# Patient Record
Sex: MALE | Race: OTHER | Employment: UNEMPLOYED | URBAN - METROPOLITAN AREA
[De-identification: names, ages, dates, MRNs, and addresses within clinical notes are randomized per-mention and may not be internally consistent; named-entity substitution may affect disease eponyms.]

---

## 2024-09-25 ENCOUNTER — APPOINTMENT (OUTPATIENT)
Dept: GENERAL RADIOLOGY | Age: 49
DRG: 208 | End: 2024-09-25

## 2024-09-25 ENCOUNTER — HOSPITAL ENCOUNTER (INPATIENT)
Age: 49
LOS: 7 days | Discharge: NURSING, REHAB OR LONG-TERM CARE | DRG: 208 | End: 2024-10-02
Attending: EMERGENCY MEDICINE | Admitting: INTERNAL MEDICINE

## 2024-09-25 DIAGNOSIS — I50.9 CONGESTIVE HEART FAILURE, UNSPECIFIED HF CHRONICITY, UNSPECIFIED HEART FAILURE TYPE (HCC): ICD-10-CM

## 2024-09-25 DIAGNOSIS — J96.00 ACUTE RESPIRATORY FAILURE, UNSPECIFIED WHETHER WITH HYPOXIA OR HYPERCAPNIA: ICD-10-CM

## 2024-09-25 DIAGNOSIS — J45.901 ASTHMA WITH ACUTE EXACERBATION, UNSPECIFIED ASTHMA SEVERITY, UNSPECIFIED WHETHER PERSISTENT: Primary | ICD-10-CM

## 2024-09-25 LAB
AADO2: 116.2 MMHG
AADO2: 303.1 MMHG
ALBUMIN SERPL-MCNC: 4.7 G/DL (ref 3.5–5.2)
ALP SERPL-CCNC: 96 U/L (ref 40–129)
ALT SERPL-CCNC: 54 U/L (ref 0–40)
ANION GAP SERPL CALCULATED.3IONS-SCNC: 13 MMOL/L (ref 7–16)
AST SERPL-CCNC: 36 U/L (ref 0–39)
B.E.: -11.5 MMOL/L (ref -3–3)
B.E.: -6.9 MMOL/L (ref -3–3)
BASOPHILS # BLD: 0.14 K/UL (ref 0–0.2)
BASOPHILS NFR BLD: 1 % (ref 0–2)
BILIRUB SERPL-MCNC: 0.3 MG/DL (ref 0–1.2)
BUN SERPL-MCNC: 20 MG/DL (ref 6–20)
CALCIUM SERPL-MCNC: 9.9 MG/DL (ref 8.6–10.2)
CHLORIDE SERPL-SCNC: 102 MMOL/L (ref 98–107)
CO2 SERPL-SCNC: 23 MMOL/L (ref 22–29)
COHB: 0.2 % (ref 0–1.5)
COHB: 0.5 % (ref 0–1.5)
COMMENT: ABNORMAL
CREAT SERPL-MCNC: 1.1 MG/DL (ref 0.7–1.2)
CRITICAL: ABNORMAL
CRITICAL: ABNORMAL
DATE ANALYZED: ABNORMAL
DATE ANALYZED: ABNORMAL
DATE OF COLLECTION: ABNORMAL
DATE OF COLLECTION: ABNORMAL
EOSINOPHIL # BLD: 1.63 K/UL (ref 0.05–0.5)
EOSINOPHILS RELATIVE PERCENT: 10 % (ref 0–6)
ERYTHROCYTE [DISTWIDTH] IN BLOOD BY AUTOMATED COUNT: 12.8 % (ref 11.5–15)
FIO2: 100 %
FIO2: 60 %
FLUAV RNA RESP QL NAA+PROBE: NOT DETECTED
FLUBV RNA RESP QL NAA+PROBE: NOT DETECTED
GFR, ESTIMATED: 87 ML/MIN/1.73M2
GLUCOSE BLD-MCNC: 329 MG/DL (ref 74–99)
GLUCOSE SERPL-MCNC: 180 MG/DL (ref 74–99)
HCO3: 21.2 MMOL/L (ref 22–26)
HCO3: 21.5 MMOL/L (ref 22–26)
HCT VFR BLD AUTO: 47.6 % (ref 37–54)
HGB BLD-MCNC: 16 G/DL (ref 12.5–16.5)
HHB: 0.5 % (ref 0–5)
HHB: 18.3 % (ref 0–5)
IMM GRANULOCYTES # BLD AUTO: 0.11 K/UL (ref 0–0.58)
IMM GRANULOCYTES NFR BLD: 1 % (ref 0–5)
LAB: ABNORMAL
LAB: ABNORMAL
LYMPHOCYTES NFR BLD: 3.83 K/UL (ref 1.5–4)
LYMPHOCYTES RELATIVE PERCENT: 23 % (ref 20–42)
Lab: 1618
Lab: 2054
MAGNESIUM SERPL-MCNC: 2.1 MG/DL (ref 1.6–2.6)
MCH RBC QN AUTO: 29.1 PG (ref 26–35)
MCHC RBC AUTO-ENTMCNC: 33.6 G/DL (ref 32–34.5)
MCV RBC AUTO: 86.7 FL (ref 80–99.9)
METHB: 0.4 % (ref 0–1.5)
METHB: 0.6 % (ref 0–1.5)
MODE: ABNORMAL
MODE: AC
MONOCYTES NFR BLD: 1.04 K/UL (ref 0.1–0.95)
MONOCYTES NFR BLD: 6 % (ref 2–12)
NEUTROPHILS NFR BLD: 60 % (ref 43–80)
NEUTS SEG NFR BLD: 10.3 K/UL (ref 1.8–7.3)
O2 SATURATION: 81.5 % (ref 92–98.5)
O2 SATURATION: 99.5 % (ref 92–98.5)
O2HB: 80.8 % (ref 94–97)
O2HB: 98.7 % (ref 94–97)
OPERATOR ID: 8217
OPERATOR ID: 8219
PATIENT TEMP: 37 C
PATIENT TEMP: 37 C
PCO2: 52.4 MMHG (ref 35–45)
PCO2: 85.3 MMHG (ref 35–45)
PEEP/CPAP: 6 CMH2O
PEEP/CPAP: 8 CMH2O
PFO2: 0.87 MMHG/%
PFO2: 4.86 MMHG/%
PH BLOOD GAS: 7.02 (ref 7.35–7.45)
PH BLOOD GAS: 7.22 (ref 7.35–7.45)
PIP: 12 CMH2O
PLATELET # BLD AUTO: 362 K/UL (ref 130–450)
PMV BLD AUTO: 9.2 FL (ref 7–12)
PO2: 486.5 MMHG (ref 75–100)
PO2: 52.1 MMHG (ref 75–100)
POTASSIUM SERPL-SCNC: 5.1 MMOL/L (ref 3.5–5)
POTASSIUM SERPL-SCNC: 6 MMOL/L (ref 3.5–5)
PROCALCITONIN SERPL-MCNC: 0.13 NG/ML (ref 0–0.08)
PROT SERPL-MCNC: 8.5 G/DL (ref 6.4–8.3)
RBC # BLD AUTO: 5.49 M/UL (ref 3.8–5.8)
RI(T): 0.24
RI(T): 5.82
RR MECHANICAL: 16 B/MIN
SARS-COV-2 RNA RESP QL NAA+PROBE: NOT DETECTED
SODIUM SERPL-SCNC: 138 MMOL/L (ref 132–146)
SOURCE, BLOOD GAS: ABNORMAL
SOURCE, BLOOD GAS: ABNORMAL
SOURCE: NORMAL
SPECIMEN DESCRIPTION: NORMAL
THB: 15.1 G/DL (ref 11.5–16.5)
THB: 15.4 G/DL (ref 11.5–16.5)
TIME ANALYZED: 1620
TIME ANALYZED: 2059
VT MECHANICAL: 500 ML
WBC OTHER # BLD: 17.1 K/UL (ref 4.5–11.5)

## 2024-09-25 PROCEDURE — 6370000000 HC RX 637 (ALT 250 FOR IP): Performed by: INTERNAL MEDICINE

## 2024-09-25 PROCEDURE — 6370000000 HC RX 637 (ALT 250 FOR IP): Performed by: EMERGENCY MEDICINE

## 2024-09-25 PROCEDURE — 74018 RADEX ABDOMEN 1 VIEW: CPT

## 2024-09-25 PROCEDURE — 2580000003 HC RX 258: Performed by: EMERGENCY MEDICINE

## 2024-09-25 PROCEDURE — 85025 COMPLETE CBC W/AUTO DIFF WBC: CPT

## 2024-09-25 PROCEDURE — 31500 INSERT EMERGENCY AIRWAY: CPT

## 2024-09-25 PROCEDURE — 82962 GLUCOSE BLOOD TEST: CPT

## 2024-09-25 PROCEDURE — 80053 COMPREHEN METABOLIC PANEL: CPT

## 2024-09-25 PROCEDURE — 96372 THER/PROPH/DIAG INJ SC/IM: CPT

## 2024-09-25 PROCEDURE — 6360000002 HC RX W HCPCS: Performed by: EMERGENCY MEDICINE

## 2024-09-25 PROCEDURE — 94002 VENT MGMT INPAT INIT DAY: CPT

## 2024-09-25 PROCEDURE — 96367 TX/PROPH/DG ADDL SEQ IV INF: CPT

## 2024-09-25 PROCEDURE — 94640 AIRWAY INHALATION TREATMENT: CPT

## 2024-09-25 PROCEDURE — 99291 CRITICAL CARE FIRST HOUR: CPT

## 2024-09-25 PROCEDURE — 6360000002 HC RX W HCPCS: Performed by: INTERNAL MEDICINE

## 2024-09-25 PROCEDURE — 0BH17EZ INSERTION OF ENDOTRACHEAL AIRWAY INTO TRACHEA, VIA NATURAL OR ARTIFICIAL OPENING: ICD-10-PCS | Performed by: INTERNAL MEDICINE

## 2024-09-25 PROCEDURE — 87636 SARSCOV2 & INF A&B AMP PRB: CPT

## 2024-09-25 PROCEDURE — 96366 THER/PROPH/DIAG IV INF ADDON: CPT

## 2024-09-25 PROCEDURE — 2000000000 HC ICU R&B

## 2024-09-25 PROCEDURE — 71045 X-RAY EXAM CHEST 1 VIEW: CPT

## 2024-09-25 PROCEDURE — 99223 1ST HOSP IP/OBS HIGH 75: CPT | Performed by: INTERNAL MEDICINE

## 2024-09-25 PROCEDURE — 84132 ASSAY OF SERUM POTASSIUM: CPT

## 2024-09-25 PROCEDURE — 84145 PROCALCITONIN (PCT): CPT

## 2024-09-25 PROCEDURE — 5A1945Z RESPIRATORY VENTILATION, 24-96 CONSECUTIVE HOURS: ICD-10-PCS | Performed by: INTERNAL MEDICINE

## 2024-09-25 PROCEDURE — 82805 BLOOD GASES W/O2 SATURATION: CPT

## 2024-09-25 PROCEDURE — 96365 THER/PROPH/DIAG IV INF INIT: CPT

## 2024-09-25 PROCEDURE — 96375 TX/PRO/DX INJ NEW DRUG ADDON: CPT

## 2024-09-25 PROCEDURE — 2580000003 HC RX 258: Performed by: INTERNAL MEDICINE

## 2024-09-25 PROCEDURE — 96361 HYDRATE IV INFUSION ADD-ON: CPT

## 2024-09-25 PROCEDURE — 5A09457 ASSISTANCE WITH RESPIRATORY VENTILATION, 24-96 CONSECUTIVE HOURS, CONTINUOUS POSITIVE AIRWAY PRESSURE: ICD-10-PCS | Performed by: INTERNAL MEDICINE

## 2024-09-25 PROCEDURE — 2500000003 HC RX 250 WO HCPCS

## 2024-09-25 PROCEDURE — 83735 ASSAY OF MAGNESIUM: CPT

## 2024-09-25 PROCEDURE — 2500000003 HC RX 250 WO HCPCS: Performed by: EMERGENCY MEDICINE

## 2024-09-25 RX ORDER — ROCURONIUM BROMIDE 10 MG/ML
100 INJECTION, SOLUTION INTRAVENOUS ONCE
Status: DISCONTINUED | OUTPATIENT
Start: 2024-09-25 | End: 2024-09-26

## 2024-09-25 RX ORDER — IPRATROPIUM BROMIDE AND ALBUTEROL SULFATE 2.5; .5 MG/3ML; MG/3ML
1 SOLUTION RESPIRATORY (INHALATION)
Status: DISCONTINUED | OUTPATIENT
Start: 2024-09-25 | End: 2024-10-02 | Stop reason: HOSPADM

## 2024-09-25 RX ORDER — TERBUTALINE SULFATE 1 MG/ML
0.25 INJECTION, SOLUTION SUBCUTANEOUS ONCE
Status: COMPLETED | OUTPATIENT
Start: 2024-09-25 | End: 2024-09-25

## 2024-09-25 RX ORDER — CALCIUM CARBONATE 500 MG/1
500 TABLET, CHEWABLE ORAL 3 TIMES DAILY PRN
Status: DISCONTINUED | OUTPATIENT
Start: 2024-09-25 | End: 2024-10-02 | Stop reason: HOSPADM

## 2024-09-25 RX ORDER — BUDESONIDE 0.5 MG/2ML
0.5 INHALANT ORAL
Status: DISCONTINUED | OUTPATIENT
Start: 2024-09-25 | End: 2024-10-02 | Stop reason: HOSPADM

## 2024-09-25 RX ORDER — MAGNESIUM SULFATE IN WATER 40 MG/ML
2000 INJECTION, SOLUTION INTRAVENOUS ONCE
Status: COMPLETED | OUTPATIENT
Start: 2024-09-25 | End: 2024-09-25

## 2024-09-25 RX ORDER — ONDANSETRON 4 MG/1
4 TABLET, ORALLY DISINTEGRATING ORAL EVERY 8 HOURS PRN
Status: DISCONTINUED | OUTPATIENT
Start: 2024-09-25 | End: 2024-10-02 | Stop reason: HOSPADM

## 2024-09-25 RX ORDER — IPRATROPIUM BROMIDE AND ALBUTEROL SULFATE 2.5; .5 MG/3ML; MG/3ML
1 SOLUTION RESPIRATORY (INHALATION)
Status: CANCELLED | OUTPATIENT
Start: 2024-09-25

## 2024-09-25 RX ORDER — PROPOFOL 10 MG/ML
5-50 INJECTION, EMULSION INTRAVENOUS CONTINUOUS
Status: DISCONTINUED | OUTPATIENT
Start: 2024-09-25 | End: 2024-09-27

## 2024-09-25 RX ORDER — IPRATROPIUM BROMIDE AND ALBUTEROL SULFATE 2.5; .5 MG/3ML; MG/3ML
3 SOLUTION RESPIRATORY (INHALATION) ONCE
Status: COMPLETED | OUTPATIENT
Start: 2024-09-25 | End: 2024-09-25

## 2024-09-25 RX ORDER — MONTELUKAST SODIUM 10 MG/1
10 TABLET ORAL NIGHTLY
Status: DISCONTINUED | OUTPATIENT
Start: 2024-09-26 | End: 2024-09-30

## 2024-09-25 RX ORDER — HYDRALAZINE HYDROCHLORIDE 20 MG/ML
10 INJECTION INTRAMUSCULAR; INTRAVENOUS EVERY 6 HOURS PRN
Status: DISCONTINUED | OUTPATIENT
Start: 2024-09-25 | End: 2024-10-02 | Stop reason: HOSPADM

## 2024-09-25 RX ORDER — ACETAMINOPHEN 325 MG/1
650 TABLET ORAL EVERY 6 HOURS PRN
Status: DISCONTINUED | OUTPATIENT
Start: 2024-09-25 | End: 2024-09-28

## 2024-09-25 RX ORDER — ACETAMINOPHEN 650 MG/1
650 SUPPOSITORY RECTAL EVERY 6 HOURS PRN
Status: DISCONTINUED | OUTPATIENT
Start: 2024-09-25 | End: 2024-09-28

## 2024-09-25 RX ORDER — BENZONATATE 100 MG/1
100 CAPSULE ORAL 3 TIMES DAILY PRN
Status: DISCONTINUED | OUTPATIENT
Start: 2024-09-25 | End: 2024-10-02 | Stop reason: HOSPADM

## 2024-09-25 RX ORDER — ONDANSETRON 2 MG/ML
4 INJECTION INTRAMUSCULAR; INTRAVENOUS EVERY 6 HOURS PRN
Status: DISCONTINUED | OUTPATIENT
Start: 2024-09-25 | End: 2024-10-02 | Stop reason: HOSPADM

## 2024-09-25 RX ORDER — ALBUTEROL SULFATE 0.83 MG/ML
7.5 SOLUTION RESPIRATORY (INHALATION) ONCE
Status: COMPLETED | OUTPATIENT
Start: 2024-09-25 | End: 2024-09-25

## 2024-09-25 RX ORDER — GLUCAGON 1 MG/ML
1 KIT INJECTION PRN
Status: DISCONTINUED | OUTPATIENT
Start: 2024-09-25 | End: 2024-09-26 | Stop reason: SDUPTHER

## 2024-09-25 RX ORDER — 0.9 % SODIUM CHLORIDE 0.9 %
1000 INTRAVENOUS SOLUTION INTRAVENOUS ONCE
Status: COMPLETED | OUTPATIENT
Start: 2024-09-25 | End: 2024-09-25

## 2024-09-25 RX ORDER — ROSUVASTATIN CALCIUM 10 MG/1
5 TABLET, COATED ORAL NIGHTLY
Status: DISCONTINUED | OUTPATIENT
Start: 2024-09-26 | End: 2024-10-01

## 2024-09-25 RX ORDER — MIDAZOLAM HYDROCHLORIDE 2 MG/2ML
5 INJECTION, SOLUTION INTRAMUSCULAR; INTRAVENOUS ONCE
Status: COMPLETED | OUTPATIENT
Start: 2024-09-25 | End: 2024-09-25

## 2024-09-25 RX ORDER — SODIUM CHLORIDE 9 MG/ML
INJECTION, SOLUTION INTRAVENOUS PRN
Status: DISCONTINUED | OUTPATIENT
Start: 2024-09-25 | End: 2024-10-02 | Stop reason: HOSPADM

## 2024-09-25 RX ORDER — DEXTROSE MONOHYDRATE 100 MG/ML
INJECTION, SOLUTION INTRAVENOUS CONTINUOUS PRN
Status: DISCONTINUED | OUTPATIENT
Start: 2024-09-25 | End: 2024-10-02 | Stop reason: HOSPADM

## 2024-09-25 RX ORDER — ARFORMOTEROL TARTRATE 15 UG/2ML
15 SOLUTION RESPIRATORY (INHALATION)
Status: DISCONTINUED | OUTPATIENT
Start: 2024-09-25 | End: 2024-09-26

## 2024-09-25 RX ORDER — POTASSIUM CHLORIDE 7.45 MG/ML
10 INJECTION INTRAVENOUS PRN
Status: DISCONTINUED | OUTPATIENT
Start: 2024-09-25 | End: 2024-09-25

## 2024-09-25 RX ORDER — SODIUM CHLORIDE 0.9 % (FLUSH) 0.9 %
5-40 SYRINGE (ML) INJECTION PRN
Status: DISCONTINUED | OUTPATIENT
Start: 2024-09-25 | End: 2024-10-02 | Stop reason: HOSPADM

## 2024-09-25 RX ORDER — ENOXAPARIN SODIUM 100 MG/ML
40 INJECTION SUBCUTANEOUS DAILY
Status: DISCONTINUED | OUTPATIENT
Start: 2024-09-26 | End: 2024-10-02 | Stop reason: HOSPADM

## 2024-09-25 RX ORDER — SODIUM CHLORIDE 0.9 % (FLUSH) 0.9 %
5-40 SYRINGE (ML) INJECTION EVERY 12 HOURS SCHEDULED
Status: DISCONTINUED | OUTPATIENT
Start: 2024-09-25 | End: 2024-10-02 | Stop reason: HOSPADM

## 2024-09-25 RX ORDER — INSULIN LISPRO 100 [IU]/ML
0-4 INJECTION, SOLUTION INTRAVENOUS; SUBCUTANEOUS NIGHTLY
Status: DISCONTINUED | OUTPATIENT
Start: 2024-09-25 | End: 2024-09-26

## 2024-09-25 RX ORDER — LOSARTAN POTASSIUM 25 MG/1
25 TABLET ORAL DAILY
Status: DISCONTINUED | OUTPATIENT
Start: 2024-09-26 | End: 2024-10-02 | Stop reason: HOSPADM

## 2024-09-25 RX ORDER — INSULIN GLARGINE 100 [IU]/ML
12 INJECTION, SOLUTION SUBCUTANEOUS NIGHTLY
Status: DISCONTINUED | OUTPATIENT
Start: 2024-09-25 | End: 2024-09-27

## 2024-09-25 RX ORDER — MAGNESIUM SULFATE IN WATER 40 MG/ML
2000 INJECTION, SOLUTION INTRAVENOUS PRN
Status: DISCONTINUED | OUTPATIENT
Start: 2024-09-25 | End: 2024-09-25

## 2024-09-25 RX ORDER — POLYETHYLENE GLYCOL 3350 17 G/17G
17 POWDER, FOR SOLUTION ORAL DAILY PRN
Status: DISCONTINUED | OUTPATIENT
Start: 2024-09-25 | End: 2024-10-02 | Stop reason: HOSPADM

## 2024-09-25 RX ORDER — MIDAZOLAM HYDROCHLORIDE 1 MG/ML
1-10 INJECTION, SOLUTION INTRAVENOUS CONTINUOUS
Status: DISCONTINUED | OUTPATIENT
Start: 2024-09-25 | End: 2024-09-27

## 2024-09-25 RX ORDER — LANOLIN ALCOHOL/MO/W.PET/CERES
3 CREAM (GRAM) TOPICAL NIGHTLY PRN
Status: CANCELLED | OUTPATIENT
Start: 2024-09-25

## 2024-09-25 RX ORDER — TERBUTALINE SULFATE 1 MG/ML
0.25 INJECTION, SOLUTION SUBCUTANEOUS ONCE
Status: DISCONTINUED | OUTPATIENT
Start: 2024-09-25 | End: 2024-09-25

## 2024-09-25 RX ORDER — POTASSIUM CHLORIDE 1500 MG/1
40 TABLET, EXTENDED RELEASE ORAL PRN
Status: DISCONTINUED | OUTPATIENT
Start: 2024-09-25 | End: 2024-09-25

## 2024-09-25 RX ORDER — INSULIN LISPRO 100 [IU]/ML
0-4 INJECTION, SOLUTION INTRAVENOUS; SUBCUTANEOUS
Status: DISCONTINUED | OUTPATIENT
Start: 2024-09-25 | End: 2024-09-26

## 2024-09-25 RX ORDER — SODIUM CHLORIDE 9 MG/ML
INJECTION, SOLUTION INTRAVENOUS CONTINUOUS
Status: DISCONTINUED | OUTPATIENT
Start: 2024-09-25 | End: 2024-09-26

## 2024-09-25 RX ADMIN — INSULIN GLARGINE 12 UNITS: 100 INJECTION, SOLUTION SUBCUTANEOUS at 21:16

## 2024-09-25 RX ADMIN — CEFEPIME 2000 MG: 2 INJECTION, POWDER, FOR SOLUTION INTRAVENOUS at 20:09

## 2024-09-25 RX ADMIN — MIDAZOLAM 2 MG: 1 INJECTION INTRAMUSCULAR; INTRAVENOUS at 23:25

## 2024-09-25 RX ADMIN — SODIUM CHLORIDE 1000 ML: 9 INJECTION, SOLUTION INTRAVENOUS at 13:40

## 2024-09-25 RX ADMIN — SODIUM CHLORIDE, PRESERVATIVE FREE 5 ML: 5 INJECTION INTRAVENOUS at 21:25

## 2024-09-25 RX ADMIN — PROPOFOL 25 MG: 10 INJECTION, EMULSION INTRAVENOUS at 21:02

## 2024-09-25 RX ADMIN — Medication 180 MG: at 20:19

## 2024-09-25 RX ADMIN — TERBUTALINE SULFATE 0.25 MG: 1 INJECTION, SOLUTION SUBCUTANEOUS at 18:40

## 2024-09-25 RX ADMIN — SODIUM CHLORIDE: 9 INJECTION, SOLUTION INTRAVENOUS at 18:08

## 2024-09-25 RX ADMIN — ALBUTEROL SULFATE 7.5 MG: 2.5 SOLUTION RESPIRATORY (INHALATION) at 18:15

## 2024-09-25 RX ADMIN — PROPOFOL 25 MCG/KG/MIN: 10 INJECTION, EMULSION INTRAVENOUS at 21:00

## 2024-09-25 RX ADMIN — ALBUTEROL SULFATE 7.5 MG: 2.5 SOLUTION RESPIRATORY (INHALATION) at 14:40

## 2024-09-25 RX ADMIN — PROPOFOL 50 MCG/KG/MIN: 10 INJECTION, EMULSION INTRAVENOUS at 23:51

## 2024-09-25 RX ADMIN — HYDRALAZINE HYDROCHLORIDE 10 MG: 20 INJECTION INTRAMUSCULAR; INTRAVENOUS at 23:28

## 2024-09-25 RX ADMIN — IPRATROPIUM BROMIDE AND ALBUTEROL SULFATE 1 DOSE: 2.5; .5 SOLUTION RESPIRATORY (INHALATION) at 21:00

## 2024-09-25 RX ADMIN — METHYLPREDNISOLONE SODIUM SUCCINATE 125 MG: 125 INJECTION, POWDER, LYOPHILIZED, FOR SOLUTION INTRAMUSCULAR; INTRAVENOUS at 13:49

## 2024-09-25 RX ADMIN — IPRATROPIUM BROMIDE AND ALBUTEROL SULFATE 3 DOSE: 2.5; .5 SOLUTION RESPIRATORY (INHALATION) at 17:34

## 2024-09-25 RX ADMIN — MAGNESIUM SULFATE HEPTAHYDRATE 2000 MG: 40 INJECTION, SOLUTION INTRAVENOUS at 15:29

## 2024-09-25 RX ADMIN — IPRATROPIUM BROMIDE AND ALBUTEROL SULFATE 3 DOSE: 2.5; .5 SOLUTION RESPIRATORY (INHALATION) at 15:56

## 2024-09-25 RX ADMIN — IPRATROPIUM BROMIDE AND ALBUTEROL SULFATE 3 DOSE: 2.5; .5 SOLUTION RESPIRATORY (INHALATION) at 13:46

## 2024-09-25 RX ADMIN — VANCOMYCIN HYDROCHLORIDE 2250 MG: 10 INJECTION, POWDER, LYOPHILIZED, FOR SOLUTION INTRAVENOUS at 21:20

## 2024-09-25 RX ADMIN — TERBUTALINE SULFATE 0.25 MG: 1 INJECTION, SOLUTION SUBCUTANEOUS at 19:05

## 2024-09-25 RX ADMIN — MAGNESIUM SULFATE HEPTAHYDRATE 2000 MG: 40 INJECTION, SOLUTION INTRAVENOUS at 13:56

## 2024-09-25 RX ADMIN — IPRATROPIUM BROMIDE AND ALBUTEROL SULFATE 3 DOSE: 2.5; .5 SOLUTION RESPIRATORY (INHALATION) at 16:55

## 2024-09-25 RX ADMIN — Medication 27.2 MG: at 16:08

## 2024-09-25 RX ADMIN — BUDESONIDE 500 MCG: 0.5 SUSPENSION RESPIRATORY (INHALATION) at 16:57

## 2024-09-25 RX ADMIN — SODIUM CHLORIDE 1000 ML: 9 INJECTION, SOLUTION INTRAVENOUS at 14:40

## 2024-09-25 RX ADMIN — ARFORMOTEROL TARTRATE 15 MCG: 15 SOLUTION RESPIRATORY (INHALATION) at 16:56

## 2024-09-25 ASSESSMENT — LIFESTYLE VARIABLES
HOW MANY STANDARD DRINKS CONTAINING ALCOHOL DO YOU HAVE ON A TYPICAL DAY: PATIENT DOES NOT DRINK
HOW OFTEN DO YOU HAVE A DRINK CONTAINING ALCOHOL: NEVER

## 2024-09-25 ASSESSMENT — PAIN SCALES - GENERAL: PAINLEVEL_OUTOF10: 7

## 2024-09-25 ASSESSMENT — PAIN DESCRIPTION - ORIENTATION: ORIENTATION: RIGHT;LEFT

## 2024-09-25 ASSESSMENT — PULMONARY FUNCTION TESTS: PIF_VALUE: 34

## 2024-09-25 ASSESSMENT — PAIN DESCRIPTION - LOCATION: LOCATION: GENERALIZED;HEAD

## 2024-09-25 ASSESSMENT — PAIN DESCRIPTION - PAIN TYPE: TYPE: ACUTE PAIN

## 2024-09-25 ASSESSMENT — PAIN DESCRIPTION - FREQUENCY: FREQUENCY: CONTINUOUS

## 2024-09-25 ASSESSMENT — PAIN DESCRIPTION - DESCRIPTORS: DESCRIPTORS: ACHING

## 2024-09-25 ASSESSMENT — PAIN - FUNCTIONAL ASSESSMENT: PAIN_FUNCTIONAL_ASSESSMENT: ACTIVITIES ARE NOT PREVENTED

## 2024-09-25 NOTE — ED NOTES
This RN heard pt yelling \"I can't breathe\" from ED supply room. This RN went to bedside and noted primary RN at bedside. Pt noted standing up in tripod position in respiratory distress, gasping for air. HR 150s, SpO2 88%. RT entered room and gave treatments per order, notified DO, placed pt on BiPap. Pt breathing improved w/ bipap, SpO2 100%. PIV placed, meds given. Charge RN notified.

## 2024-09-25 NOTE — ED NOTES
Patient onto bed. Ketamine via IV 20. Ketamine 50 IV. Patient still on CPAP.  100 total of Ketamine given IV. SPO2 100%. 130 total of Ketamine given at this time. Patient off of cpap. 180 of Ketamine given, 100 of Beau total in.   Size 8 tube tried, size 7.5 tried. Patient bagged with SPO2 at 76%. Size 6.5 ET inserted, 25 at the lip. Patient intubated with SPO2 99%

## 2024-09-25 NOTE — H&P
Medications  Prior to Admission medications    Not on File       Allergies  Patient has no known allergies.    Social Hx  Social History     Socioeconomic History    Marital status: Single     Spouse name: Not on file    Number of children: Not on file    Years of education: Not on file    Highest education level: Not on file   Occupational History    Not on file   Tobacco Use    Smoking status: Not on file    Smokeless tobacco: Not on file   Substance and Sexual Activity    Alcohol use: Not on file    Drug use: Not on file    Sexual activity: Not on file   Other Topics Concern    Not on file   Social History Narrative    Not on file     Social Determinants of Health     Financial Resource Strain: High Risk (12/8/2023)    Received from [facility]    Overall Financial Resource Strain (CARDIA)     Difficulty of Paying Living Expenses: Very hard   Food Insecurity: No Food Insecurity (6/27/2024)    Received from [facility]    Hunger Vital Sign     Worried About Running Out of Food in the Last Year: Never true     Ran Out of Food in the Last Year: Never true   Transportation Needs: No Transportation Needs (8/9/2024)    Received from [facility] - Transportation     Lack of Transportation (Medical): No     Lack of Transportation (Non-Medical): No   Recent Concern: Transportation Needs - Unmet Transportation Needs (6/27/2024)    Received from [facility] - Transportation     Lack of Transportation (Medical): Yes     Lack of Transportation (Non-Medical): Yes   Physical Activity: Not on file   Stress: Not on file   Social Connections: Not on file   Intimate Partner Violence: Not on file   Housing Stability: High Risk (8/9/2024)    Received from [facility]    Housing Stability Vital Sign     Unable to Pay for Housing in the Last Year: Not on file     Number of Times Moved in the Last Year: Not on file     Homeless in the Last Year: Yes       Review of Systems  All bolded are positive;

## 2024-09-25 NOTE — ED PROVIDER NOTES
potential alveolar opacities in peripheral right lower chest and given antibiotics by admitting team.  Hemolyzed potassium at 6, no changes in kidney or liver function.  Repeat potassium improved to 5.1.  COVID and flu negative.     Patient was persistently demonstrating evidence of impending respiratory failure, given multiple rounds of DuoNebs, albuterol, terbutaline, second dose of magnesium and given IV ketamine as well.  Demonstrating respiratory fatigue, worsening clinical status, concern for airway compromise and respiratory failure.  Intubated for protection, IV ketamine used, complicated by inability to pass 8, 7.5, 7 ETT and finally 6.5 able to be obtained.  Continuous infusions will be given, on IV propofol for sedation.  Discussed with ICU and they will accept the patient to the unit for critical care management and internal medicine will admit the patient.          Please note that the withdrawal or failure to initiate urgent interventions for this patient would likely result in a life threatening deterioration or permanent disability.      Accordingly this patient received 35 minutes of critical care time, excluding separately billable procedures.    CONSULTS: (Who and What was discussed)  IP CONSULT TO CRITICAL CARE  PHARMACY TO DOSE VANCOMYCIN        Counseling:   The emergency provider has spoken with the patient and discussed today’s results, in addition to providing specific details for the plan of care and counseling regarding the diagnosis and prognosis.  Questions are answered at this time and they are agreeable with the plan.      I am the Primary Clinician of Record.    FINAL IMPRESSION      1. Asthma with acute exacerbation, unspecified asthma severity, unspecified whether persistent    2. Acute respiratory failure, unspecified whether with hypoxia or hypercapnia (HCC)          DISPOSITION/PLAN     DISPOSITION Admitted 09/25/2024 07:58:58 PM    (Please note that portions of this note were

## 2024-09-26 ENCOUNTER — APPOINTMENT (OUTPATIENT)
Age: 49
DRG: 208 | End: 2024-09-26

## 2024-09-26 ENCOUNTER — APPOINTMENT (OUTPATIENT)
Dept: GENERAL RADIOLOGY | Age: 49
DRG: 208 | End: 2024-09-26

## 2024-09-26 ENCOUNTER — APPOINTMENT (OUTPATIENT)
Dept: CT IMAGING | Age: 49
DRG: 208 | End: 2024-09-26

## 2024-09-26 PROBLEM — J45.901 ASTHMA WITH ACUTE EXACERBATION: Status: ACTIVE | Noted: 2024-09-26

## 2024-09-26 LAB
AADO2: 152.4 MMHG
AADO2: 194.5 MMHG
AMPHET UR QL SCN: NEGATIVE
ANION GAP SERPL CALCULATED.3IONS-SCNC: 10 MMOL/L (ref 7–16)
ANION GAP SERPL CALCULATED.3IONS-SCNC: 13 MMOL/L (ref 7–16)
ANION GAP SERPL CALCULATED.3IONS-SCNC: 13 MMOL/L (ref 7–16)
ANION GAP SERPL CALCULATED.3IONS-SCNC: 18 MMOL/L (ref 7–16)
ANION GAP SERPL CALCULATED.3IONS-SCNC: 18 MMOL/L (ref 7–16)
ANION GAP SERPL CALCULATED.3IONS-SCNC: NORMAL MMOL/L (ref 7–16)
APAP SERPL-MCNC: <5 UG/ML (ref 10–30)
B-OH-BUTYR SERPL-MCNC: 0.17 MMOL/L (ref 0.02–0.27)
B-OH-BUTYR SERPL-MCNC: NORMAL MMOL/L (ref 0.02–0.27)
B.E.: -5.8 MMOL/L (ref -3–3)
B.E.: -6.1 MMOL/L (ref -3–3)
BACTERIA URNS QL MICRO: ABNORMAL
BARBITURATES UR QL SCN: NEGATIVE
BASOPHILS # BLD: 0 K/UL (ref 0–0.2)
BASOPHILS NFR BLD: 0 % (ref 0–2)
BENZODIAZ UR QL: POSITIVE
BILIRUB UR QL STRIP: NEGATIVE
BUN SERPL-MCNC: 22 MG/DL (ref 6–20)
BUN SERPL-MCNC: 23 MG/DL (ref 6–20)
BUN SERPL-MCNC: 23 MG/DL (ref 6–20)
BUN SERPL-MCNC: 24 MG/DL (ref 6–20)
BUN SERPL-MCNC: 25 MG/DL (ref 6–20)
BUN SERPL-MCNC: NORMAL MG/DL (ref 6–20)
BUPRENORPHINE UR QL: NEGATIVE
CALCIUM SERPL-MCNC: 8.5 MG/DL (ref 8.6–10.2)
CALCIUM SERPL-MCNC: 8.5 MG/DL (ref 8.6–10.2)
CALCIUM SERPL-MCNC: 8.6 MG/DL (ref 8.6–10.2)
CALCIUM SERPL-MCNC: 8.8 MG/DL (ref 8.6–10.2)
CALCIUM SERPL-MCNC: 9.1 MG/DL (ref 8.6–10.2)
CALCIUM SERPL-MCNC: NORMAL MG/DL (ref 8.6–10.2)
CANNABINOIDS UR QL SCN: NEGATIVE
CASTS #/AREA URNS LPF: ABNORMAL /LPF
CHLORIDE SERPL-SCNC: 100 MMOL/L (ref 98–107)
CHLORIDE SERPL-SCNC: 104 MMOL/L (ref 98–107)
CHLORIDE SERPL-SCNC: 105 MMOL/L (ref 98–107)
CHLORIDE SERPL-SCNC: 106 MMOL/L (ref 98–107)
CHLORIDE SERPL-SCNC: 99 MMOL/L (ref 98–107)
CHLORIDE SERPL-SCNC: NORMAL MMOL/L (ref 98–107)
CHOLEST SERPL-MCNC: 216 MG/DL
CK SERPL-CCNC: 361 U/L (ref 20–200)
CK SERPL-CCNC: NORMAL U/L (ref 20–200)
CLARITY UR: CLEAR
CO2 SERPL-SCNC: 17 MMOL/L (ref 22–29)
CO2 SERPL-SCNC: 18 MMOL/L (ref 22–29)
CO2 SERPL-SCNC: 20 MMOL/L (ref 22–29)
CO2 SERPL-SCNC: 23 MMOL/L (ref 22–29)
CO2 SERPL-SCNC: 25 MMOL/L (ref 22–29)
CO2 SERPL-SCNC: NORMAL MMOL/L (ref 22–29)
COCAINE UR QL SCN: NEGATIVE
COHB: 0.5 % (ref 0–1.5)
COHB: 0.8 % (ref 0–1.5)
COLOR UR: YELLOW
CREAT SERPL-MCNC: 1 MG/DL (ref 0.7–1.2)
CREAT SERPL-MCNC: 1 MG/DL (ref 0.7–1.2)
CREAT SERPL-MCNC: 1.1 MG/DL (ref 0.7–1.2)
CREAT SERPL-MCNC: 1.2 MG/DL (ref 0.7–1.2)
CREAT SERPL-MCNC: 1.2 MG/DL (ref 0.7–1.2)
CREAT SERPL-MCNC: NORMAL MG/DL (ref 0.7–1.2)
CRITICAL: ABNORMAL
CRITICAL: ABNORMAL
CRP SERPL HS-MCNC: 36 MG/L (ref 0–5)
CRYSTALS URNS MICRO: ABNORMAL /HPF
DATE ANALYZED: ABNORMAL
DATE ANALYZED: ABNORMAL
DATE OF COLLECTION: ABNORMAL
DATE OF COLLECTION: ABNORMAL
ECHO AV CUSP MM: 1.4 CM
ECHO BSA: 2.15 M2
ECHO EST RA PRESSURE: 15 MMHG
ECHO LA DIAMETER INDEX: 1.78 CM/M2
ECHO LA DIAMETER: 3.8 CM
ECHO LV EF PHYSICIAN: 68 %
ECHO LV FRACTIONAL SHORTENING: 37 % (ref 28–44)
ECHO LV INTERNAL DIMENSION DIASTOLE INDEX: 2.3 CM/M2
ECHO LV INTERNAL DIMENSION DIASTOLIC: 4.9 CM (ref 4.2–5.9)
ECHO LV INTERNAL DIMENSION SYSTOLIC INDEX: 1.46 CM/M2
ECHO LV INTERNAL DIMENSION SYSTOLIC: 3.1 CM
ECHO LV IVSD: 1.2 CM (ref 0.6–1)
ECHO LV IVSS: 1.5 CM
ECHO LV MASS 2D: 226.4 G (ref 88–224)
ECHO LV MASS INDEX 2D: 106.3 G/M2 (ref 49–115)
ECHO LV POSTERIOR WALL DIASTOLIC: 1.2 CM (ref 0.6–1)
ECHO LV POSTERIOR WALL SYSTOLIC: 2 CM
ECHO LV RELATIVE WALL THICKNESS RATIO: 0.49
ECHO LVOT AREA: 5.3 CM2
ECHO LVOT DIAM: 2.6 CM
ECHO MV MAX VELOCITY: 1.1 M/S
ECHO MV MEAN GRADIENT: 2 MMHG
ECHO MV MEAN VELOCITY: 0.7 M/S
ECHO MV PEAK GRADIENT: 5 MMHG
ECHO MV VTI: 22.5 CM
ECHO PV MAX VELOCITY: 1.4 M/S
ECHO PV MEAN GRADIENT: 5 MMHG
ECHO PV MEAN VELOCITY: 1.1 M/S
ECHO PV PEAK GRADIENT: 8 MMHG
ECHO PV VTI: 24.1 CM
ECHO RV INTERNAL DIMENSION: 5.2 CM
ECHO RVOT MEAN GRADIENT: 1 MMHG
ECHO RVOT PEAK GRADIENT: 2 MMHG
ECHO RVOT PEAK VELOCITY: 0.7 M/S
ECHO RVOT VTI: 10.3 CM
EOSINOPHIL # BLD: 0 K/UL (ref 0.05–0.5)
EOSINOPHILS RELATIVE PERCENT: 0 % (ref 0–6)
ERYTHROCYTE [DISTWIDTH] IN BLOOD BY AUTOMATED COUNT: 12.9 % (ref 11.5–15)
ERYTHROCYTE [SEDIMENTATION RATE] IN BLOOD BY WESTERGREN METHOD: 32 MM/HR (ref 0–15)
ETHANOLAMINE SERPL-MCNC: <10 MG/DL (ref 0–0.08)
FENTANYL UR QL: NEGATIVE
FIO2: 50 %
FIO2: 60 %
GFR, ESTIMATED: 76 ML/MIN/1.73M2
GFR, ESTIMATED: 77 ML/MIN/1.73M2
GFR, ESTIMATED: 84 ML/MIN/1.73M2
GFR, ESTIMATED: >90 ML/MIN/1.73M2
GFR, ESTIMATED: >90 ML/MIN/1.73M2
GFR, ESTIMATED: NORMAL ML/MIN/1.73M2
GLUCOSE BLD-MCNC: 171 MG/DL (ref 74–99)
GLUCOSE BLD-MCNC: 178 MG/DL (ref 74–99)
GLUCOSE BLD-MCNC: 207 MG/DL (ref 74–99)
GLUCOSE BLD-MCNC: 209 MG/DL (ref 74–99)
GLUCOSE BLD-MCNC: 221 MG/DL (ref 74–99)
GLUCOSE BLD-MCNC: 256 MG/DL (ref 74–99)
GLUCOSE BLD-MCNC: 282 MG/DL (ref 74–99)
GLUCOSE BLD-MCNC: 307 MG/DL (ref 74–99)
GLUCOSE BLD-MCNC: 338 MG/DL (ref 74–99)
GLUCOSE SERPL-MCNC: 135 MG/DL (ref 74–99)
GLUCOSE SERPL-MCNC: 141 MG/DL (ref 74–99)
GLUCOSE SERPL-MCNC: 189 MG/DL (ref 74–99)
GLUCOSE SERPL-MCNC: 301 MG/DL (ref 74–99)
GLUCOSE SERPL-MCNC: 366 MG/DL (ref 74–99)
GLUCOSE SERPL-MCNC: NORMAL MG/DL (ref 74–99)
GLUCOSE UR STRIP-MCNC: >=1000 MG/DL
HBA1C MFR BLD: 7.9 % (ref 4–5.6)
HCO3: 18.3 MMOL/L (ref 22–26)
HCO3: 19.6 MMOL/L (ref 22–26)
HCT VFR BLD AUTO: 38 % (ref 37–54)
HDLC SERPL-MCNC: 69 MG/DL
HGB BLD-MCNC: 12.6 G/DL (ref 12.5–16.5)
HGB UR QL STRIP.AUTO: NEGATIVE
HHB: 0.9 % (ref 0–5)
HHB: 1.1 % (ref 0–5)
KETONES UR STRIP-MCNC: NEGATIVE MG/DL
L PNEUMO1 AG UR QL IA.RAPID: NEGATIVE
LAB: ABNORMAL
LAB: ABNORMAL
LACTATE BLDV-SCNC: 0.8 MMOL/L (ref 0.5–2.2)
LACTATE BLDV-SCNC: 3.7 MMOL/L (ref 0.5–2.2)
LACTATE BLDV-SCNC: 4.6 MMOL/L (ref 0.5–2.2)
LDH SERPL-CCNC: 158 U/L (ref 135–225)
LDLC SERPL CALC-MCNC: 136 MG/DL
LEUKOCYTE ESTERASE UR QL STRIP: NEGATIVE
LYMPHOCYTES NFR BLD: 0.96 K/UL (ref 1.5–4)
LYMPHOCYTES RELATIVE PERCENT: 6 % (ref 20–42)
Lab: 114
Lab: 400
MAGNESIUM SERPL-MCNC: 2.1 MG/DL (ref 1.6–2.6)
MAGNESIUM SERPL-MCNC: 2.2 MG/DL (ref 1.6–2.6)
MAGNESIUM SERPL-MCNC: 2.2 MG/DL (ref 1.6–2.6)
MAGNESIUM SERPL-MCNC: 2.4 MG/DL (ref 1.6–2.6)
MAGNESIUM SERPL-MCNC: NORMAL MG/DL (ref 1.6–2.6)
MCH RBC QN AUTO: 28.3 PG (ref 26–35)
MCHC RBC AUTO-ENTMCNC: 33.2 G/DL (ref 32–34.5)
MCV RBC AUTO: 85.2 FL (ref 80–99.9)
METHADONE UR QL: NEGATIVE
METHB: 0.5 % (ref 0–1.5)
METHB: 0.5 % (ref 0–1.5)
MODE: AC
MODE: AC
MONOCYTES NFR BLD: 0.69 K/UL (ref 0.1–0.95)
MONOCYTES NFR BLD: 4 % (ref 2–12)
NEUTROPHILS NFR BLD: 90 % (ref 43–80)
NEUTS SEG NFR BLD: 14.15 K/UL (ref 1.8–7.3)
NITRITE UR QL STRIP: NEGATIVE
O2 SATURATION: 98.9 % (ref 92–98.5)
O2 SATURATION: 99.1 % (ref 92–98.5)
O2HB: 97.6 % (ref 94–97)
O2HB: 98.1 % (ref 94–97)
OPERATOR ID: 3342
OPERATOR ID: 3342
OPIATES UR QL SCN: NEGATIVE
OXYCODONE UR QL SCN: NEGATIVE
PATIENT TEMP: 37 C
PATIENT TEMP: 37 C
PCO2: 32.8 MMHG (ref 35–45)
PCO2: 38.2 MMHG (ref 35–45)
PCP UR QL SCN: NEGATIVE
PEEP/CPAP: 8 CMH2O
PEEP/CPAP: 8 CMH2O
PFO2: 3.19 MMHG/%
PFO2: 3.34 MMHG/%
PH BLOOD GAS: 7.33 (ref 7.35–7.45)
PH BLOOD GAS: 7.36 (ref 7.35–7.45)
PH UR STRIP: 5.5 [PH] (ref 5–9)
PHOSPHATE SERPL-MCNC: 2 MG/DL (ref 2.5–4.5)
PHOSPHATE SERPL-MCNC: 2.3 MG/DL (ref 2.5–4.5)
PHOSPHATE SERPL-MCNC: 2.8 MG/DL (ref 2.5–4.5)
PHOSPHATE SERPL-MCNC: 3.7 MG/DL (ref 2.5–4.5)
PLATELET # BLD AUTO: 264 K/UL (ref 130–450)
PMV BLD AUTO: 9.3 FL (ref 7–12)
PO2: 167.2 MMHG (ref 75–100)
PO2: 191.3 MMHG (ref 75–100)
POTASSIUM SERPL-SCNC: 4.6 MMOL/L (ref 3.5–5)
POTASSIUM SERPL-SCNC: 4.9 MMOL/L (ref 3.5–5)
POTASSIUM SERPL-SCNC: 5 MMOL/L (ref 3.5–5)
POTASSIUM SERPL-SCNC: 5.2 MMOL/L (ref 3.5–5)
POTASSIUM SERPL-SCNC: 6.2 MMOL/L (ref 3.5–5)
POTASSIUM SERPL-SCNC: NORMAL MMOL/L (ref 3.5–5)
PROT UR STRIP-MCNC: 30 MG/DL
RBC # BLD AUTO: 4.46 M/UL (ref 3.8–5.8)
RBC # BLD: ABNORMAL 10*6/UL
RBC #/AREA URNS HPF: ABNORMAL /HPF
RI(T): 0.91
RI(T): 1.02
RR MECHANICAL: 22 B/MIN
RR MECHANICAL: 22 B/MIN
S PNEUM AG SPEC QL: NEGATIVE
SALICYLATES SERPL-MCNC: <0.3 MG/DL (ref 0–30)
SODIUM SERPL-SCNC: 134 MMOL/L (ref 132–146)
SODIUM SERPL-SCNC: 136 MMOL/L (ref 132–146)
SODIUM SERPL-SCNC: 139 MMOL/L (ref 132–146)
SODIUM SERPL-SCNC: 140 MMOL/L (ref 132–146)
SODIUM SERPL-SCNC: 140 MMOL/L (ref 132–146)
SODIUM SERPL-SCNC: NORMAL MMOL/L (ref 132–146)
SOURCE, BLOOD GAS: ABNORMAL
SOURCE, BLOOD GAS: ABNORMAL
SP GR UR STRIP: >1.03 (ref 1–1.03)
SPECIMEN SOURCE: NORMAL
TEST INFORMATION: ABNORMAL
THB: 13.5 G/DL (ref 11.5–16.5)
THB: 14.9 G/DL (ref 11.5–16.5)
TIME ANALYZED: 129
TIME ANALYZED: 406
TOXIC TRICYCLIC SC,BLOOD: NEGATIVE
TRIGL SERPL-MCNC: 55 MG/DL
TROPONIN I SERPL HS-MCNC: 103 NG/L (ref 0–11)
TROPONIN I SERPL HS-MCNC: 160 NG/L (ref 0–11)
TROPONIN I SERPL HS-MCNC: 47 NG/L (ref 0–11)
TROPONIN I SERPL HS-MCNC: NORMAL NG/L (ref 0–11)
TSH SERPL DL<=0.05 MIU/L-ACNC: 0.57 UIU/ML (ref 0.27–4.2)
UROBILINOGEN UR STRIP-ACNC: 0.2 EU/DL (ref 0–1)
VLDLC SERPL CALC-MCNC: 11 MG/DL
VT MECHANICAL: 500 ML
VT MECHANICAL: 500 ML
WBC #/AREA URNS HPF: ABNORMAL /HPF
WBC OTHER # BLD: 15.8 K/UL (ref 4.5–11.5)

## 2024-09-26 PROCEDURE — 83036 HEMOGLOBIN GLYCOSYLATED A1C: CPT

## 2024-09-26 PROCEDURE — 99291 CRITICAL CARE FIRST HOUR: CPT | Performed by: INTERNAL MEDICINE

## 2024-09-26 PROCEDURE — 85652 RBC SED RATE AUTOMATED: CPT

## 2024-09-26 PROCEDURE — 2580000003 HC RX 258

## 2024-09-26 PROCEDURE — 80048 BASIC METABOLIC PNL TOTAL CA: CPT

## 2024-09-26 PROCEDURE — 83605 ASSAY OF LACTIC ACID: CPT

## 2024-09-26 PROCEDURE — 93306 TTE W/DOPPLER COMPLETE: CPT | Performed by: INTERNAL MEDICINE

## 2024-09-26 PROCEDURE — 83615 LACTATE (LD) (LDH) ENZYME: CPT

## 2024-09-26 PROCEDURE — 82550 ASSAY OF CK (CPK): CPT

## 2024-09-26 PROCEDURE — 6360000002 HC RX W HCPCS: Performed by: INTERNAL MEDICINE

## 2024-09-26 PROCEDURE — 85025 COMPLETE CBC W/AUTO DIFF WBC: CPT

## 2024-09-26 PROCEDURE — 84484 ASSAY OF TROPONIN QUANT: CPT

## 2024-09-26 PROCEDURE — 87899 AGENT NOS ASSAY W/OPTIC: CPT

## 2024-09-26 PROCEDURE — 80307 DRUG TEST PRSMV CHEM ANLYZR: CPT

## 2024-09-26 PROCEDURE — 2580000003 HC RX 258: Performed by: INTERNAL MEDICINE

## 2024-09-26 PROCEDURE — 82010 KETONE BODYS QUAN: CPT

## 2024-09-26 PROCEDURE — 84100 ASSAY OF PHOSPHORUS: CPT

## 2024-09-26 PROCEDURE — 99223 1ST HOSP IP/OBS HIGH 75: CPT | Performed by: INTERNAL MEDICINE

## 2024-09-26 PROCEDURE — 83735 ASSAY OF MAGNESIUM: CPT

## 2024-09-26 PROCEDURE — 81001 URINALYSIS AUTO W/SCOPE: CPT

## 2024-09-26 PROCEDURE — C8929 TTE W OR WO FOL WCON,DOPPLER: HCPCS

## 2024-09-26 PROCEDURE — 6370000000 HC RX 637 (ALT 250 FOR IP): Performed by: INTERNAL MEDICINE

## 2024-09-26 PROCEDURE — 6360000002 HC RX W HCPCS

## 2024-09-26 PROCEDURE — 2000000000 HC ICU R&B

## 2024-09-26 PROCEDURE — 6360000004 HC RX CONTRAST MEDICATION

## 2024-09-26 PROCEDURE — 2500000003 HC RX 250 WO HCPCS: Performed by: INTERNAL MEDICINE

## 2024-09-26 PROCEDURE — 86140 C-REACTIVE PROTEIN: CPT

## 2024-09-26 PROCEDURE — 82805 BLOOD GASES W/O2 SATURATION: CPT

## 2024-09-26 PROCEDURE — 84443 ASSAY THYROID STIM HORMONE: CPT

## 2024-09-26 PROCEDURE — 6360000002 HC RX W HCPCS: Performed by: EMERGENCY MEDICINE

## 2024-09-26 PROCEDURE — 70450 CT HEAD/BRAIN W/O DYE: CPT

## 2024-09-26 PROCEDURE — 87449 NOS EACH ORGANISM AG IA: CPT

## 2024-09-26 PROCEDURE — 94003 VENT MGMT INPAT SUBQ DAY: CPT

## 2024-09-26 PROCEDURE — 51702 INSERT TEMP BLADDER CATH: CPT

## 2024-09-26 PROCEDURE — 87081 CULTURE SCREEN ONLY: CPT

## 2024-09-26 PROCEDURE — G0480 DRUG TEST DEF 1-7 CLASSES: HCPCS

## 2024-09-26 PROCEDURE — 51701 INSERT BLADDER CATHETER: CPT

## 2024-09-26 PROCEDURE — 71045 X-RAY EXAM CHEST 1 VIEW: CPT

## 2024-09-26 PROCEDURE — 6370000000 HC RX 637 (ALT 250 FOR IP)

## 2024-09-26 PROCEDURE — 80179 DRUG ASSAY SALICYLATE: CPT

## 2024-09-26 PROCEDURE — 93005 ELECTROCARDIOGRAM TRACING: CPT

## 2024-09-26 PROCEDURE — 2500000003 HC RX 250 WO HCPCS

## 2024-09-26 PROCEDURE — 87070 CULTURE OTHR SPECIMN AEROBIC: CPT

## 2024-09-26 PROCEDURE — 94640 AIRWAY INHALATION TREATMENT: CPT

## 2024-09-26 PROCEDURE — 80143 DRUG ASSAY ACETAMINOPHEN: CPT

## 2024-09-26 PROCEDURE — 51798 US URINE CAPACITY MEASURE: CPT

## 2024-09-26 PROCEDURE — 82962 GLUCOSE BLOOD TEST: CPT

## 2024-09-26 PROCEDURE — 87205 SMEAR GRAM STAIN: CPT

## 2024-09-26 PROCEDURE — 36415 COLL VENOUS BLD VENIPUNCTURE: CPT

## 2024-09-26 PROCEDURE — 80061 LIPID PANEL: CPT

## 2024-09-26 RX ORDER — INSULIN LISPRO 100 [IU]/ML
0-8 INJECTION, SOLUTION INTRAVENOUS; SUBCUTANEOUS EVERY 6 HOURS
Status: DISCONTINUED | OUTPATIENT
Start: 2024-09-26 | End: 2024-09-27

## 2024-09-26 RX ORDER — 0.9 % SODIUM CHLORIDE 0.9 %
1000 INTRAVENOUS SOLUTION INTRAVENOUS ONCE
Status: COMPLETED | OUTPATIENT
Start: 2024-09-26 | End: 2024-09-26

## 2024-09-26 RX ORDER — GLUCAGON 1 MG/ML
1 KIT INJECTION PRN
Status: DISCONTINUED | OUTPATIENT
Start: 2024-09-26 | End: 2024-10-02 | Stop reason: HOSPADM

## 2024-09-26 RX ORDER — 0.9 % SODIUM CHLORIDE 0.9 %
500 INTRAVENOUS SOLUTION INTRAVENOUS ONCE
Status: COMPLETED | OUTPATIENT
Start: 2024-09-26 | End: 2024-09-26

## 2024-09-26 RX ORDER — DEXTROSE MONOHYDRATE 100 MG/ML
INJECTION, SOLUTION INTRAVENOUS CONTINUOUS PRN
Status: DISCONTINUED | OUTPATIENT
Start: 2024-09-26 | End: 2024-10-02 | Stop reason: HOSPADM

## 2024-09-26 RX ORDER — INSULIN LISPRO 100 [IU]/ML
0-4 INJECTION, SOLUTION INTRAVENOUS; SUBCUTANEOUS EVERY 6 HOURS
Status: DISCONTINUED | OUTPATIENT
Start: 2024-09-26 | End: 2024-09-26 | Stop reason: DRUGHIGH

## 2024-09-26 RX ADMIN — IPRATROPIUM BROMIDE AND ALBUTEROL SULFATE 1 DOSE: 2.5; .5 SOLUTION RESPIRATORY (INHALATION) at 19:43

## 2024-09-26 RX ADMIN — SODIUM BICARBONATE 50 MEQ: 84 INJECTION INTRAVENOUS at 11:03

## 2024-09-26 RX ADMIN — KETAMINE HYDROCHLORIDE 0.2 MG/KG/HR: 100 INJECTION, SOLUTION, CONCENTRATE INTRAMUSCULAR; INTRAVENOUS at 10:54

## 2024-09-26 RX ADMIN — SODIUM CHLORIDE 500 ML: 9 INJECTION, SOLUTION INTRAVENOUS at 03:07

## 2024-09-26 RX ADMIN — PROPOFOL 50 MCG/KG/MIN: 10 INJECTION, EMULSION INTRAVENOUS at 13:47

## 2024-09-26 RX ADMIN — Medication 9 MG/HR: at 16:18

## 2024-09-26 RX ADMIN — ARFORMOTEROL TARTRATE 15 MCG: 15 SOLUTION RESPIRATORY (INHALATION) at 09:43

## 2024-09-26 RX ADMIN — CEFEPIME 2000 MG: 2 INJECTION, POWDER, FOR SOLUTION INTRAVENOUS at 11:35

## 2024-09-26 RX ADMIN — CEFEPIME 2000 MG: 2 INJECTION, POWDER, FOR SOLUTION INTRAVENOUS at 03:42

## 2024-09-26 RX ADMIN — PROPOFOL 50 MCG/KG/MIN: 10 INJECTION, EMULSION INTRAVENOUS at 06:32

## 2024-09-26 RX ADMIN — WATER 40 MG: 1 INJECTION INTRAMUSCULAR; INTRAVENOUS; SUBCUTANEOUS at 01:10

## 2024-09-26 RX ADMIN — PANTOPRAZOLE SODIUM 40 MG: 40 INJECTION, POWDER, FOR SOLUTION INTRAVENOUS at 08:46

## 2024-09-26 RX ADMIN — IPRATROPIUM BROMIDE AND ALBUTEROL SULFATE 1 DOSE: 2.5; .5 SOLUTION RESPIRATORY (INHALATION) at 13:50

## 2024-09-26 RX ADMIN — INSULIN LISPRO 8 UNITS: 100 INJECTION, SOLUTION INTRAVENOUS; SUBCUTANEOUS at 01:16

## 2024-09-26 RX ADMIN — INSULIN HUMAN 10 UNITS: 100 INJECTION, SOLUTION PARENTERAL at 03:04

## 2024-09-26 RX ADMIN — WATER 60 MG: 1 INJECTION INTRAMUSCULAR; INTRAVENOUS; SUBCUTANEOUS at 11:02

## 2024-09-26 RX ADMIN — SODIUM CHLORIDE, PRESERVATIVE FREE 10 ML: 5 INJECTION INTRAVENOUS at 20:43

## 2024-09-26 RX ADMIN — PROPOFOL 50 MCG/KG/MIN: 10 INJECTION, EMULSION INTRAVENOUS at 16:15

## 2024-09-26 RX ADMIN — BUDESONIDE 500 MCG: 0.5 SUSPENSION RESPIRATORY (INHALATION) at 19:43

## 2024-09-26 RX ADMIN — BUDESONIDE 500 MCG: 0.5 SUSPENSION RESPIRATORY (INHALATION) at 09:44

## 2024-09-26 RX ADMIN — PROPOFOL 50 MCG/KG/MIN: 10 INJECTION, EMULSION INTRAVENOUS at 09:42

## 2024-09-26 RX ADMIN — SODIUM CHLORIDE: 9 INJECTION, SOLUTION INTRAVENOUS at 03:21

## 2024-09-26 RX ADMIN — IPRATROPIUM BROMIDE AND ALBUTEROL SULFATE 1 DOSE: 2.5; .5 SOLUTION RESPIRATORY (INHALATION) at 16:14

## 2024-09-26 RX ADMIN — ENOXAPARIN SODIUM 40 MG: 100 INJECTION SUBCUTANEOUS at 08:45

## 2024-09-26 RX ADMIN — PROPOFOL 50 MCG/KG/MIN: 10 INJECTION, EMULSION INTRAVENOUS at 23:24

## 2024-09-26 RX ADMIN — SODIUM CHLORIDE 1250 MG: 9 INJECTION, SOLUTION INTRAVENOUS at 09:40

## 2024-09-26 RX ADMIN — MONTELUKAST 10 MG: 10 TABLET, FILM COATED ORAL at 20:15

## 2024-09-26 RX ADMIN — DEXTROSE MONOHYDRATE 250 ML: 100 INJECTION, SOLUTION INTRAVENOUS at 03:03

## 2024-09-26 RX ADMIN — INSULIN HUMAN 5 UNITS: 100 INJECTION, SOLUTION PARENTERAL at 01:17

## 2024-09-26 RX ADMIN — IPRATROPIUM BROMIDE AND ALBUTEROL SULFATE 1 DOSE: 2.5; .5 SOLUTION RESPIRATORY (INHALATION) at 09:44

## 2024-09-26 RX ADMIN — SODIUM CHLORIDE 1000 ML: 9 INJECTION, SOLUTION INTRAVENOUS at 06:52

## 2024-09-26 RX ADMIN — SODIUM CHLORIDE 1250 MG: 9 INJECTION, SOLUTION INTRAVENOUS at 21:55

## 2024-09-26 RX ADMIN — PROPOFOL 50 MCG/KG/MIN: 10 INJECTION, EMULSION INTRAVENOUS at 20:23

## 2024-09-26 RX ADMIN — PROPOFOL 50 MCG/KG/MIN: 10 INJECTION, EMULSION INTRAVENOUS at 03:06

## 2024-09-26 RX ADMIN — SODIUM PHOSPHATE, MONOBASIC, MONOHYDRATE AND SODIUM PHOSPHATE, DIBASIC, ANHYDROUS 15 MMOL: 142; 276 INJECTION, SOLUTION INTRAVENOUS at 06:49

## 2024-09-26 RX ADMIN — SODIUM CHLORIDE, PRESERVATIVE FREE 10 ML: 5 INJECTION INTRAVENOUS at 08:47

## 2024-09-26 RX ADMIN — WATER 60 MG: 1 INJECTION INTRAMUSCULAR; INTRAVENOUS; SUBCUTANEOUS at 23:25

## 2024-09-26 RX ADMIN — CALCIUM GLUCONATE 2000 MG: 98 INJECTION, SOLUTION INTRAVENOUS at 03:22

## 2024-09-26 RX ADMIN — IPRATROPIUM BROMIDE AND ALBUTEROL SULFATE 1 DOSE: 2.5; .5 SOLUTION RESPIRATORY (INHALATION) at 00:38

## 2024-09-26 RX ADMIN — Medication 2 MG/HR: at 00:24

## 2024-09-26 RX ADMIN — CEFEPIME 2000 MG: 2 INJECTION, POWDER, FOR SOLUTION INTRAVENOUS at 19:27

## 2024-09-26 RX ADMIN — WATER 60 MG: 1 INJECTION INTRAMUSCULAR; INTRAVENOUS; SUBCUTANEOUS at 17:35

## 2024-09-26 RX ADMIN — ROSUVASTATIN 5 MG: 10 TABLET, FILM COATED ORAL at 20:15

## 2024-09-26 ASSESSMENT — PULMONARY FUNCTION TESTS
PIF_VALUE: 39
PIF_VALUE: 41
PIF_VALUE: 46
PIF_VALUE: 41
PIF_VALUE: 39
PIF_VALUE: 46
PIF_VALUE: 40
PIF_VALUE: 41
PIF_VALUE: 37
PIF_VALUE: 42
PIF_VALUE: 40
PIF_VALUE: 45
PIF_VALUE: 45
PIF_VALUE: 44
PIF_VALUE: 40
PIF_VALUE: 34
PIF_VALUE: 41
PIF_VALUE: 41
PIF_VALUE: 40
PIF_VALUE: 41
PIF_VALUE: 43
PIF_VALUE: 46
PIF_VALUE: 41
PIF_VALUE: 44
PIF_VALUE: 32
PIF_VALUE: 41
PIF_VALUE: 46
PIF_VALUE: 40
PIF_VALUE: 41
PIF_VALUE: 43

## 2024-09-26 ASSESSMENT — PAIN SCALES - GENERAL
PAINLEVEL_OUTOF10: 0

## 2024-09-27 ENCOUNTER — APPOINTMENT (OUTPATIENT)
Dept: GENERAL RADIOLOGY | Age: 49
DRG: 208 | End: 2024-09-27

## 2024-09-27 PROBLEM — R73.9 STEROID-INDUCED HYPERGLYCEMIA: Status: ACTIVE | Noted: 2024-09-27

## 2024-09-27 PROBLEM — E11.65 POORLY CONTROLLED TYPE 2 DIABETES MELLITUS (HCC): Status: ACTIVE | Noted: 2024-09-27

## 2024-09-27 PROBLEM — Z91.119 DIETARY NONCOMPLIANCE: Status: ACTIVE | Noted: 2024-09-27

## 2024-09-27 PROBLEM — E78.2 MIXED HYPERLIPIDEMIA: Status: ACTIVE | Noted: 2024-09-27

## 2024-09-27 PROBLEM — T38.0X5A STEROID-INDUCED HYPERGLYCEMIA: Status: ACTIVE | Noted: 2024-09-27

## 2024-09-27 LAB
AADO2: 127.7 MMHG
AADO2: 138.8 MMHG
AMPHET UR-MCNC: <100 NG/ML
ANION GAP SERPL CALCULATED.3IONS-SCNC: 12 MMOL/L (ref 7–16)
B.E.: -1.3 MMOL/L (ref -3–3)
B.E.: 0.9 MMOL/L (ref -3–3)
B.E.: 1.6 MMOL/L (ref -3–3)
B.E.: 1.7 MMOL/L (ref -3–3)
BASOPHILS # BLD: 0.01 K/UL (ref 0–0.2)
BASOPHILS NFR BLD: 0 % (ref 0–2)
BUN SERPL-MCNC: 25 MG/DL (ref 6–20)
CALCIUM SERPL-MCNC: 9 MG/DL (ref 8.6–10.2)
CHLORIDE SERPL-SCNC: 104 MMOL/L (ref 98–107)
CO2 SERPL-SCNC: 23 MMOL/L (ref 22–29)
COHB: 0.3 % (ref 0–1.5)
COHB: 0.3 % (ref 0–1.5)
COHB: 0.6 % (ref 0–1.5)
COMPLIANCE DRUG ANALYSIS, URINE: NORMAL
CREAT SERPL-MCNC: 0.9 MG/DL (ref 0.7–1.2)
CRITICAL: ABNORMAL
CRITICAL: NORMAL
DATE ANALYZED: ABNORMAL
DATE ANALYZED: NORMAL
DATE OF COLLECTION: ABNORMAL
DATE OF COLLECTION: NORMAL
EKG ATRIAL RATE: 118 BPM
EKG P AXIS: 73 DEGREES
EKG P-R INTERVAL: 180 MS
EKG Q-T INTERVAL: 326 MS
EKG QRS DURATION: 108 MS
EKG QTC CALCULATION (BAZETT): 456 MS
EKG R AXIS: 32 DEGREES
EKG T AXIS: 48 DEGREES
EKG VENTRICULAR RATE: 118 BPM
EOSINOPHIL # BLD: 0 K/UL (ref 0.05–0.5)
EOSINOPHILS RELATIVE PERCENT: 0 % (ref 0–6)
EPHEDRIN UR QL: <100 NG/ML
ERYTHROCYTE [DISTWIDTH] IN BLOOD BY AUTOMATED COUNT: 12.8 % (ref 11.5–15)
FIO2: 40 %
FIO2: 40 %
GFR, ESTIMATED: >90 ML/MIN/1.73M2
GLUCOSE BLD-MCNC: 176 MG/DL (ref 74–99)
GLUCOSE BLD-MCNC: 184 MG/DL (ref 74–99)
GLUCOSE BLD-MCNC: 194 MG/DL (ref 74–99)
GLUCOSE BLD-MCNC: 212 MG/DL (ref 74–99)
GLUCOSE BLD-MCNC: 219 MG/DL (ref 74–99)
GLUCOSE SERPL-MCNC: 217 MG/DL (ref 74–99)
HCO3: 22.3 MMOL/L (ref 22–26)
HCO3: 23.6 MMOL/L (ref 22–26)
HCO3: 25.6 MMOL/L (ref 22–26)
HCO3: 25.9 MMOL/L (ref 22–26)
HCT VFR BLD AUTO: 34.2 % (ref 37–54)
HGB BLD-MCNC: 11.6 G/DL (ref 12.5–16.5)
HHB: 1.8 % (ref 0–5)
HHB: 2.1 % (ref 0–5)
HHB: 4.2 % (ref 0–5)
IGA SERPL-MCNC: 123 MG/DL (ref 70–400)
IGG SERPL-MCNC: 1196 MG/DL (ref 700–1600)
IGM SERPL-MCNC: 67 MG/DL (ref 40–230)
IMM GRANULOCYTES # BLD AUTO: 0.17 K/UL (ref 0–0.58)
IMM GRANULOCYTES NFR BLD: 1 % (ref 0–5)
LAB: ABNORMAL
LAB: NORMAL
LYMPHOCYTES NFR BLD: 0.47 K/UL (ref 1.5–4)
LYMPHOCYTES RELATIVE PERCENT: 3 % (ref 20–42)
Lab: 1230
Lab: 1425
Lab: 1654
Lab: 423
MAGNESIUM SERPL-MCNC: 2.4 MG/DL (ref 1.6–2.6)
MCH RBC QN AUTO: 28.7 PG (ref 26–35)
MCHC RBC AUTO-ENTMCNC: 33.9 G/DL (ref 32–34.5)
MCV RBC AUTO: 84.7 FL (ref 80–99.9)
MDA, QUANTITATIVE, URINE: <100 NG/ML
MDEA, QUANTITATIVE, URINE: <100 NG/ML
MDMA UR QL: <100 NG/ML
METHAMPHETAMINE QUANTITATIVE URINE: <100 NG/ML
METHB: 0.4 % (ref 0–1.5)
METHB: 0.5 % (ref 0–1.5)
METHB: 0.5 % (ref 0–1.5)
MICROORGANISM SPEC CULT: NORMAL
MICROORGANISM SPEC CULT: NORMAL
MICROORGANISM/AGENT SPEC: NORMAL
MODE: ABNORMAL
MODE: ABNORMAL
MODE: AC
MODE: NORMAL
MONOCYTES NFR BLD: 0.3 K/UL (ref 0.1–0.95)
MONOCYTES NFR BLD: 2 % (ref 2–12)
NEUTROPHILS NFR BLD: 93 % (ref 43–80)
NEUTS SEG NFR BLD: 13.13 K/UL (ref 1.8–7.3)
O2 SATURATION: 95.8 % (ref 92–98.5)
O2 SATURATION: 97.3 % (ref 92–98.5)
O2 SATURATION: 97.9 % (ref 92–98.5)
O2 SATURATION: 98.2 % (ref 92–98.5)
O2HB: 95 % (ref 94–97)
O2HB: 97.1 % (ref 94–97)
O2HB: 97.2 % (ref 94–97)
OPERATOR ID: 2962
OPERATOR ID: 359
OPERATOR ID: 405
OPERATOR ID: ABNORMAL
PATIENT TEMP: 37 C
PCO2: 31.5 MMHG (ref 35–45)
PCO2: 34.5 MMHG (ref 35–45)
PCO2: 38.3 MMHG (ref 35–45)
PCO2: 39.6 MMHG (ref 35–45)
PEEP/CPAP: 10 CMH2O
PEEP/CPAP: 8 CMH2O
PFO2: 2.75 MMHG/%
PFO2: 2.8 MMHG/%
PH BLOOD GAS: 7.43 (ref 7.35–7.45)
PH BLOOD GAS: 7.43 (ref 7.35–7.45)
PH BLOOD GAS: 7.44 (ref 7.35–7.45)
PH BLOOD GAS: 7.49 (ref 7.35–7.45)
PHENTERMINE, URINE, QUANTITATIVE: <100 NG/ML
PHOSPHATE SERPL-MCNC: 3.4 MG/DL (ref 2.5–4.5)
PLATELET # BLD AUTO: 221 K/UL (ref 130–450)
PMV BLD AUTO: 9.4 FL (ref 7–12)
PO2: 110.2 MMHG (ref 75–100)
PO2: 112 MMHG (ref 75–100)
PO2: 85.5 MMHG (ref 75–100)
PO2: 92.7 MMHG (ref 75–100)
POTASSIUM SERPL-SCNC: 4.3 MMOL/L (ref 3.5–5)
RBC # BLD AUTO: 4.04 M/UL (ref 3.8–5.8)
RBC # BLD: ABNORMAL 10*6/UL
RI(T): 1.14
RI(T): 1.26
RR MECHANICAL: 22 B/MIN
SODIUM SERPL-SCNC: 139 MMOL/L (ref 132–146)
SOURCE, BLOOD GAS: ABNORMAL
SOURCE, BLOOD GAS: NORMAL
SPECIMEN DESCRIPTION: NORMAL
SPECIMEN DESCRIPTION: NORMAL
THB: 12.3 G/DL (ref 11.5–16.5)
THB: 13.8 G/DL (ref 11.5–16.5)
THB: 14.2 G/DL (ref 11.5–16.5)
TIME ANALYZED: 1241
TIME ANALYZED: 1430
TIME ANALYZED: 1702
TIME ANALYZED: 429
TRIGL SERPL-MCNC: 249 MG/DL
TROPONIN I SERPL HS-MCNC: 27 NG/L (ref 0–11)
TROPONIN I SERPL HS-MCNC: 33 NG/L (ref 0–11)
VT MECHANICAL: 500 ML
WBC OTHER # BLD: 14.1 K/UL (ref 4.5–11.5)

## 2024-09-27 PROCEDURE — 99223 1ST HOSP IP/OBS HIGH 75: CPT | Performed by: INTERNAL MEDICINE

## 2024-09-27 PROCEDURE — 6360000002 HC RX W HCPCS

## 2024-09-27 PROCEDURE — 86003 ALLG SPEC IGE CRUDE XTRC EA: CPT

## 2024-09-27 PROCEDURE — 82103 ALPHA-1-ANTITRYPSIN TOTAL: CPT

## 2024-09-27 PROCEDURE — 80048 BASIC METABOLIC PNL TOTAL CA: CPT

## 2024-09-27 PROCEDURE — 86606 ASPERGILLUS ANTIBODY: CPT

## 2024-09-27 PROCEDURE — 82962 GLUCOSE BLOOD TEST: CPT

## 2024-09-27 PROCEDURE — 84484 ASSAY OF TROPONIN QUANT: CPT

## 2024-09-27 PROCEDURE — 2500000003 HC RX 250 WO HCPCS: Performed by: INTERNAL MEDICINE

## 2024-09-27 PROCEDURE — 36415 COLL VENOUS BLD VENIPUNCTURE: CPT

## 2024-09-27 PROCEDURE — 6370000000 HC RX 637 (ALT 250 FOR IP): Performed by: INTERNAL MEDICINE

## 2024-09-27 PROCEDURE — 86331 IMMUNODIFFUSION OUCHTERLONY: CPT

## 2024-09-27 PROCEDURE — 71045 X-RAY EXAM CHEST 1 VIEW: CPT

## 2024-09-27 PROCEDURE — 2580000003 HC RX 258

## 2024-09-27 PROCEDURE — 6360000002 HC RX W HCPCS: Performed by: EMERGENCY MEDICINE

## 2024-09-27 PROCEDURE — 94660 CPAP INITIATION&MGMT: CPT

## 2024-09-27 PROCEDURE — 94640 AIRWAY INHALATION TREATMENT: CPT

## 2024-09-27 PROCEDURE — 94003 VENT MGMT INPAT SUBQ DAY: CPT

## 2024-09-27 PROCEDURE — 82104 ALPHA-1-ANTITRYPSIN PHENO: CPT

## 2024-09-27 PROCEDURE — 82803 BLOOD GASES ANY COMBINATION: CPT

## 2024-09-27 PROCEDURE — 2580000003 HC RX 258: Performed by: INTERNAL MEDICINE

## 2024-09-27 PROCEDURE — 6360000002 HC RX W HCPCS: Performed by: INTERNAL MEDICINE

## 2024-09-27 PROCEDURE — 83735 ASSAY OF MAGNESIUM: CPT

## 2024-09-27 PROCEDURE — 6370000000 HC RX 637 (ALT 250 FOR IP)

## 2024-09-27 PROCEDURE — 82805 BLOOD GASES W/O2 SATURATION: CPT

## 2024-09-27 PROCEDURE — 2700000000 HC OXYGEN THERAPY PER DAY

## 2024-09-27 PROCEDURE — 84100 ASSAY OF PHOSPHORUS: CPT

## 2024-09-27 PROCEDURE — 82785 ASSAY OF IGE: CPT

## 2024-09-27 PROCEDURE — 99291 CRITICAL CARE FIRST HOUR: CPT | Performed by: INTERNAL MEDICINE

## 2024-09-27 PROCEDURE — 82787 IGG 1 2 3 OR 4 EACH: CPT

## 2024-09-27 PROCEDURE — 93010 ELECTROCARDIOGRAM REPORT: CPT | Performed by: INTERNAL MEDICINE

## 2024-09-27 PROCEDURE — 99222 1ST HOSP IP/OBS MODERATE 55: CPT | Performed by: INTERNAL MEDICINE

## 2024-09-27 PROCEDURE — 82784 ASSAY IGA/IGD/IGG/IGM EACH: CPT

## 2024-09-27 PROCEDURE — 2500000003 HC RX 250 WO HCPCS

## 2024-09-27 PROCEDURE — 2000000000 HC ICU R&B

## 2024-09-27 PROCEDURE — 85025 COMPLETE CBC W/AUTO DIFF WBC: CPT

## 2024-09-27 PROCEDURE — 84478 ASSAY OF TRIGLYCERIDES: CPT

## 2024-09-27 RX ORDER — MAGNESIUM SULFATE IN WATER 40 MG/ML
2000 INJECTION, SOLUTION INTRAVENOUS ONCE
Status: COMPLETED | OUTPATIENT
Start: 2024-09-27 | End: 2024-09-27

## 2024-09-27 RX ORDER — INSULIN LISPRO 100 [IU]/ML
0-12 INJECTION, SOLUTION INTRAVENOUS; SUBCUTANEOUS EVERY 6 HOURS
Status: DISCONTINUED | OUTPATIENT
Start: 2024-09-27 | End: 2024-09-28

## 2024-09-27 RX ORDER — IPRATROPIUM BROMIDE AND ALBUTEROL SULFATE 2.5; .5 MG/3ML; MG/3ML
1 SOLUTION RESPIRATORY (INHALATION) ONCE
Status: COMPLETED | OUTPATIENT
Start: 2024-09-27 | End: 2024-09-27

## 2024-09-27 RX ADMIN — IPRATROPIUM BROMIDE AND ALBUTEROL SULFATE 1 DOSE: 2.5; .5 SOLUTION RESPIRATORY (INHALATION) at 15:50

## 2024-09-27 RX ADMIN — HYDRALAZINE HYDROCHLORIDE 10 MG: 20 INJECTION INTRAMUSCULAR; INTRAVENOUS at 12:09

## 2024-09-27 RX ADMIN — IPRATROPIUM BROMIDE AND ALBUTEROL SULFATE 1 DOSE: 2.5; .5 SOLUTION RESPIRATORY (INHALATION) at 20:10

## 2024-09-27 RX ADMIN — Medication 9 MG/HR: at 05:50

## 2024-09-27 RX ADMIN — PROPOFOL 50 MCG/KG/MIN: 10 INJECTION, EMULSION INTRAVENOUS at 02:57

## 2024-09-27 RX ADMIN — PROPOFOL 50 MCG/KG/MIN: 10 INJECTION, EMULSION INTRAVENOUS at 06:43

## 2024-09-27 RX ADMIN — SODIUM CHLORIDE, PRESERVATIVE FREE 10 ML: 5 INJECTION INTRAVENOUS at 20:34

## 2024-09-27 RX ADMIN — WATER 60 MG: 1 INJECTION INTRAMUSCULAR; INTRAVENOUS; SUBCUTANEOUS at 16:10

## 2024-09-27 RX ADMIN — IPRATROPIUM BROMIDE AND ALBUTEROL SULFATE 1 DOSE: 2.5; .5 SOLUTION RESPIRATORY (INHALATION) at 11:40

## 2024-09-27 RX ADMIN — WATER 60 MG: 1 INJECTION INTRAMUSCULAR; INTRAVENOUS; SUBCUTANEOUS at 11:15

## 2024-09-27 RX ADMIN — INSULIN LISPRO 2 UNITS: 100 INJECTION, SOLUTION INTRAVENOUS; SUBCUTANEOUS at 06:07

## 2024-09-27 RX ADMIN — ROSUVASTATIN 5 MG: 10 TABLET, FILM COATED ORAL at 20:33

## 2024-09-27 RX ADMIN — PROPOFOL 30 MCG/KG/MIN: 10 INJECTION, EMULSION INTRAVENOUS at 09:58

## 2024-09-27 RX ADMIN — MONTELUKAST 10 MG: 10 TABLET, FILM COATED ORAL at 20:34

## 2024-09-27 RX ADMIN — KETAMINE HYDROCHLORIDE 0.45 MG/KG/HR: 100 INJECTION, SOLUTION, CONCENTRATE INTRAMUSCULAR; INTRAVENOUS at 02:49

## 2024-09-27 RX ADMIN — WATER 2000 MG: 1 INJECTION INTRAMUSCULAR; INTRAVENOUS; SUBCUTANEOUS at 14:34

## 2024-09-27 RX ADMIN — WATER 60 MG: 1 INJECTION INTRAMUSCULAR; INTRAVENOUS; SUBCUTANEOUS at 04:31

## 2024-09-27 RX ADMIN — BUDESONIDE 500 MCG: 0.5 SUSPENSION RESPIRATORY (INHALATION) at 07:47

## 2024-09-27 RX ADMIN — DEXMEDETOMIDINE HYDROCHLORIDE 0.2 MCG/KG/HR: 100 INJECTION, SOLUTION, CONCENTRATE INTRAVENOUS at 18:07

## 2024-09-27 RX ADMIN — PANTOPRAZOLE SODIUM 40 MG: 40 INJECTION, POWDER, FOR SOLUTION INTRAVENOUS at 08:36

## 2024-09-27 RX ADMIN — HYDRALAZINE HYDROCHLORIDE 10 MG: 20 INJECTION INTRAMUSCULAR; INTRAVENOUS at 16:09

## 2024-09-27 RX ADMIN — INSULIN LISPRO 2 UNITS: 100 INJECTION, SOLUTION INTRAVENOUS; SUBCUTANEOUS at 19:08

## 2024-09-27 RX ADMIN — SODIUM CHLORIDE, PRESERVATIVE FREE 10 ML: 5 INJECTION INTRAVENOUS at 08:37

## 2024-09-27 RX ADMIN — SODIUM CHLORIDE 1250 MG: 9 INJECTION, SOLUTION INTRAVENOUS at 10:05

## 2024-09-27 RX ADMIN — CEFEPIME 2000 MG: 2 INJECTION, POWDER, FOR SOLUTION INTRAVENOUS at 03:17

## 2024-09-27 RX ADMIN — BUDESONIDE 500 MCG: 0.5 SUSPENSION RESPIRATORY (INHALATION) at 20:10

## 2024-09-27 RX ADMIN — WATER 60 MG: 1 INJECTION INTRAMUSCULAR; INTRAVENOUS; SUBCUTANEOUS at 23:15

## 2024-09-27 RX ADMIN — ENOXAPARIN SODIUM 40 MG: 100 INJECTION SUBCUTANEOUS at 08:36

## 2024-09-27 RX ADMIN — IPRATROPIUM BROMIDE AND ALBUTEROL SULFATE 1 DOSE: 2.5; .5 SOLUTION RESPIRATORY (INHALATION) at 07:47

## 2024-09-27 RX ADMIN — MAGNESIUM SULFATE HEPTAHYDRATE 2000 MG: 40 INJECTION, SOLUTION INTRAVENOUS at 14:33

## 2024-09-27 RX ADMIN — IPRATROPIUM BROMIDE AND ALBUTEROL SULFATE 1 DOSE: .5; 2.5 SOLUTION RESPIRATORY (INHALATION) at 14:07

## 2024-09-27 RX ADMIN — CEFEPIME 2000 MG: 2 INJECTION, POWDER, FOR SOLUTION INTRAVENOUS at 11:45

## 2024-09-27 ASSESSMENT — PULMONARY FUNCTION TESTS
PIF_VALUE: 39
PIF_VALUE: 38
PIF_VALUE: 17
PIF_VALUE: 31
PIF_VALUE: 34
PIF_VALUE: 38
PIF_VALUE: 36
PIF_VALUE: 39
PIF_VALUE: 34
PIF_VALUE: 33
PIF_VALUE: 37

## 2024-09-27 ASSESSMENT — PAIN SCALES - GENERAL
PAINLEVEL_OUTOF10: 10
PAINLEVEL_OUTOF10: 10
PAINLEVEL_OUTOF10: 0
PAINLEVEL_OUTOF10: 0
PAINLEVEL_OUTOF10: 10

## 2024-09-27 ASSESSMENT — PAIN DESCRIPTION - LOCATION
LOCATION: CHEST
LOCATION: CHEST

## 2024-09-27 ASSESSMENT — PAIN DESCRIPTION - PAIN TYPE: TYPE: ACUTE PAIN

## 2024-09-27 ASSESSMENT — PAIN DESCRIPTION - DESCRIPTORS: DESCRIPTORS: ACHING;PRESSURE

## 2024-09-27 ASSESSMENT — PAIN DESCRIPTION - ORIENTATION: ORIENTATION: LEFT

## 2024-09-28 ENCOUNTER — APPOINTMENT (OUTPATIENT)
Dept: GENERAL RADIOLOGY | Age: 49
DRG: 208 | End: 2024-09-28

## 2024-09-28 LAB
AADO2: 92.3 MMHG
ANION GAP SERPL CALCULATED.3IONS-SCNC: 11 MMOL/L (ref 7–16)
B.E.: -0.7 MMOL/L (ref -3–3)
BASOPHILS # BLD: 0.01 K/UL (ref 0–0.2)
BASOPHILS NFR BLD: 0 % (ref 0–2)
BUN SERPL-MCNC: 35 MG/DL (ref 6–20)
CALCIUM SERPL-MCNC: 9.4 MG/DL (ref 8.6–10.2)
CHLORIDE SERPL-SCNC: 102 MMOL/L (ref 98–107)
CO2 SERPL-SCNC: 25 MMOL/L (ref 22–29)
COHB: 0.3 % (ref 0–1.5)
CREAT SERPL-MCNC: 0.8 MG/DL (ref 0.7–1.2)
CRITICAL: ABNORMAL
DATE ANALYZED: ABNORMAL
DATE OF COLLECTION: ABNORMAL
EOSINOPHIL # BLD: 0 K/UL (ref 0.05–0.5)
EOSINOPHILS RELATIVE PERCENT: 0 % (ref 0–6)
ERYTHROCYTE [DISTWIDTH] IN BLOOD BY AUTOMATED COUNT: 12.9 % (ref 11.5–15)
FIO2: 40 %
GFR, ESTIMATED: >90 ML/MIN/1.73M2
GLUCOSE BLD-MCNC: 178 MG/DL (ref 74–99)
GLUCOSE BLD-MCNC: 191 MG/DL (ref 74–99)
GLUCOSE BLD-MCNC: 201 MG/DL (ref 74–99)
GLUCOSE BLD-MCNC: 206 MG/DL (ref 74–99)
GLUCOSE BLD-MCNC: 273 MG/DL (ref 74–99)
GLUCOSE SERPL-MCNC: 207 MG/DL (ref 74–99)
HCO3: 23.8 MMOL/L (ref 22–26)
HCT VFR BLD AUTO: 39.6 % (ref 37–54)
HGB BLD-MCNC: 13.1 G/DL (ref 12.5–16.5)
HHB: 1.3 % (ref 0–5)
IMM GRANULOCYTES # BLD AUTO: 0.12 K/UL (ref 0–0.58)
IMM GRANULOCYTES NFR BLD: 1 % (ref 0–5)
LAB: ABNORMAL
LYMPHOCYTES NFR BLD: 0.57 K/UL (ref 1.5–4)
LYMPHOCYTES RELATIVE PERCENT: 4 % (ref 20–42)
Lab: 447
MAGNESIUM SERPL-MCNC: 2.6 MG/DL (ref 1.6–2.6)
MCH RBC QN AUTO: 28.5 PG (ref 26–35)
MCHC RBC AUTO-ENTMCNC: 33.1 G/DL (ref 32–34.5)
MCV RBC AUTO: 86.3 FL (ref 80–99.9)
METHB: 0.4 % (ref 0–1.5)
MODE: ABNORMAL
MONOCYTES NFR BLD: 0.35 K/UL (ref 0.1–0.95)
MONOCYTES NFR BLD: 2 % (ref 2–12)
NEUTROPHILS NFR BLD: 93 % (ref 43–80)
NEUTS SEG NFR BLD: 13.26 K/UL (ref 1.8–7.3)
O2 SATURATION: 98.7 % (ref 92–98.5)
O2HB: 98 % (ref 94–97)
OPERATOR ID: 2962
PATIENT TEMP: 37 C
PCO2: 38.8 MMHG (ref 35–45)
PFO2: 3.71 MMHG/%
PH BLOOD GAS: 7.41 (ref 7.35–7.45)
PHOSPHATE SERPL-MCNC: 3.6 MG/DL (ref 2.5–4.5)
PLATELET # BLD AUTO: 254 K/UL (ref 130–450)
PMV BLD AUTO: 9.3 FL (ref 7–12)
PO2: 148.3 MMHG (ref 75–100)
POTASSIUM SERPL-SCNC: 4.9 MMOL/L (ref 3.5–5)
RBC # BLD AUTO: 4.59 M/UL (ref 3.8–5.8)
RBC # BLD: ABNORMAL 10*6/UL
RBC # BLD: ABNORMAL 10*6/UL
RI(T): 0.62
SODIUM SERPL-SCNC: 138 MMOL/L (ref 132–146)
SOURCE, BLOOD GAS: ABNORMAL
THB: 13.7 G/DL (ref 11.5–16.5)
TIME ANALYZED: 455
WBC OTHER # BLD: 14.3 K/UL (ref 4.5–11.5)

## 2024-09-28 PROCEDURE — 94640 AIRWAY INHALATION TREATMENT: CPT

## 2024-09-28 PROCEDURE — 6370000000 HC RX 637 (ALT 250 FOR IP): Performed by: INTERNAL MEDICINE

## 2024-09-28 PROCEDURE — 83735 ASSAY OF MAGNESIUM: CPT

## 2024-09-28 PROCEDURE — 94660 CPAP INITIATION&MGMT: CPT

## 2024-09-28 PROCEDURE — 80048 BASIC METABOLIC PNL TOTAL CA: CPT

## 2024-09-28 PROCEDURE — 99291 CRITICAL CARE FIRST HOUR: CPT | Performed by: INTERNAL MEDICINE

## 2024-09-28 PROCEDURE — 2580000003 HC RX 258

## 2024-09-28 PROCEDURE — 84100 ASSAY OF PHOSPHORUS: CPT

## 2024-09-28 PROCEDURE — 71045 X-RAY EXAM CHEST 1 VIEW: CPT

## 2024-09-28 PROCEDURE — 6370000000 HC RX 637 (ALT 250 FOR IP)

## 2024-09-28 PROCEDURE — 6360000002 HC RX W HCPCS

## 2024-09-28 PROCEDURE — 2580000003 HC RX 258: Performed by: INTERNAL MEDICINE

## 2024-09-28 PROCEDURE — 82805 BLOOD GASES W/O2 SATURATION: CPT

## 2024-09-28 PROCEDURE — 2700000000 HC OXYGEN THERAPY PER DAY

## 2024-09-28 PROCEDURE — 85025 COMPLETE CBC W/AUTO DIFF WBC: CPT

## 2024-09-28 PROCEDURE — 99223 1ST HOSP IP/OBS HIGH 75: CPT | Performed by: INTERNAL MEDICINE

## 2024-09-28 PROCEDURE — 82962 GLUCOSE BLOOD TEST: CPT

## 2024-09-28 PROCEDURE — 6360000002 HC RX W HCPCS: Performed by: INTERNAL MEDICINE

## 2024-09-28 PROCEDURE — 2000000000 HC ICU R&B

## 2024-09-28 RX ORDER — IPRATROPIUM BROMIDE AND ALBUTEROL SULFATE 2.5; .5 MG/3ML; MG/3ML
1 SOLUTION RESPIRATORY (INHALATION) EVERY 4 HOURS PRN
Status: DISCONTINUED | OUTPATIENT
Start: 2024-09-28 | End: 2024-10-02 | Stop reason: HOSPADM

## 2024-09-28 RX ORDER — INSULIN LISPRO 100 [IU]/ML
0-8 INJECTION, SOLUTION INTRAVENOUS; SUBCUTANEOUS
Status: DISCONTINUED | OUTPATIENT
Start: 2024-09-28 | End: 2024-09-29

## 2024-09-28 RX ORDER — MECOBALAMIN 5000 MCG
5 TABLET,DISINTEGRATING ORAL NIGHTLY
Status: DISCONTINUED | OUTPATIENT
Start: 2024-09-28 | End: 2024-10-02 | Stop reason: HOSPADM

## 2024-09-28 RX ORDER — HYDROXYZINE HYDROCHLORIDE 50 MG/ML
25 INJECTION, SOLUTION INTRAMUSCULAR EVERY 6 HOURS PRN
Status: DISCONTINUED | OUTPATIENT
Start: 2024-09-28 | End: 2024-10-02 | Stop reason: HOSPADM

## 2024-09-28 RX ORDER — ACETAMINOPHEN 325 MG/1
650 TABLET ORAL EVERY 6 HOURS PRN
Status: DISCONTINUED | OUTPATIENT
Start: 2024-09-28 | End: 2024-10-02 | Stop reason: HOSPADM

## 2024-09-28 RX ORDER — IPRATROPIUM BROMIDE AND ALBUTEROL SULFATE 2.5; .5 MG/3ML; MG/3ML
1 SOLUTION RESPIRATORY (INHALATION) ONCE
Status: COMPLETED | OUTPATIENT
Start: 2024-09-28 | End: 2024-09-28

## 2024-09-28 RX ORDER — ACETAMINOPHEN 650 MG/1
650 SUPPOSITORY RECTAL EVERY 6 HOURS PRN
Status: DISCONTINUED | OUTPATIENT
Start: 2024-09-28 | End: 2024-10-02 | Stop reason: HOSPADM

## 2024-09-28 RX ORDER — PANTOPRAZOLE SODIUM 40 MG/1
40 TABLET, DELAYED RELEASE ORAL
Status: DISCONTINUED | OUTPATIENT
Start: 2024-09-28 | End: 2024-10-01

## 2024-09-28 RX ORDER — IBUPROFEN 400 MG/1
400 TABLET, FILM COATED ORAL EVERY 6 HOURS PRN
Status: DISCONTINUED | OUTPATIENT
Start: 2024-09-28 | End: 2024-10-02

## 2024-09-28 RX ORDER — INSULIN LISPRO 100 [IU]/ML
0-4 INJECTION, SOLUTION INTRAVENOUS; SUBCUTANEOUS NIGHTLY
Status: DISCONTINUED | OUTPATIENT
Start: 2024-09-28 | End: 2024-10-02 | Stop reason: HOSPADM

## 2024-09-28 RX ORDER — HYDROXYZINE PAMOATE 25 MG/1
25 CAPSULE ORAL ONCE
Status: COMPLETED | OUTPATIENT
Start: 2024-09-28 | End: 2024-09-28

## 2024-09-28 RX ADMIN — LOSARTAN POTASSIUM 25 MG: 25 TABLET, FILM COATED ORAL at 08:10

## 2024-09-28 RX ADMIN — IPRATROPIUM BROMIDE AND ALBUTEROL SULFATE 1 DOSE: 2.5; .5 SOLUTION RESPIRATORY (INHALATION) at 08:18

## 2024-09-28 RX ADMIN — IPRATROPIUM BROMIDE AND ALBUTEROL SULFATE 1 DOSE: 2.5; .5 SOLUTION RESPIRATORY (INHALATION) at 19:49

## 2024-09-28 RX ADMIN — BUDESONIDE 500 MCG: 0.5 SUSPENSION RESPIRATORY (INHALATION) at 08:19

## 2024-09-28 RX ADMIN — IBUPROFEN 400 MG: 400 TABLET, FILM COATED ORAL at 13:16

## 2024-09-28 RX ADMIN — Medication 5 MG: at 22:16

## 2024-09-28 RX ADMIN — ROSUVASTATIN 5 MG: 10 TABLET, FILM COATED ORAL at 20:08

## 2024-09-28 RX ADMIN — IPRATROPIUM BROMIDE AND ALBUTEROL SULFATE 1 DOSE: 2.5; .5 SOLUTION RESPIRATORY (INHALATION) at 16:19

## 2024-09-28 RX ADMIN — PANTOPRAZOLE SODIUM 40 MG: 40 INJECTION, POWDER, FOR SOLUTION INTRAVENOUS at 08:10

## 2024-09-28 RX ADMIN — SODIUM CHLORIDE, PRESERVATIVE FREE 10 ML: 5 INJECTION INTRAVENOUS at 08:11

## 2024-09-28 RX ADMIN — WATER 60 MG: 1 INJECTION INTRAMUSCULAR; INTRAVENOUS; SUBCUTANEOUS at 20:08

## 2024-09-28 RX ADMIN — HYDROXYZINE HYDROCHLORIDE 25 MG: 50 INJECTION, SOLUTION INTRAMUSCULAR at 22:17

## 2024-09-28 RX ADMIN — ENOXAPARIN SODIUM 40 MG: 100 INJECTION SUBCUTANEOUS at 08:10

## 2024-09-28 RX ADMIN — IPRATROPIUM BROMIDE AND ALBUTEROL SULFATE 1 DOSE: 2.5; .5 SOLUTION RESPIRATORY (INHALATION) at 22:02

## 2024-09-28 RX ADMIN — HYDROXYZINE PAMOATE 25 MG: 25 CAPSULE ORAL at 14:57

## 2024-09-28 RX ADMIN — ACETAMINOPHEN 650 MG: 325 TABLET ORAL at 21:23

## 2024-09-28 RX ADMIN — MONTELUKAST 10 MG: 10 TABLET, FILM COATED ORAL at 20:08

## 2024-09-28 RX ADMIN — INSULIN LISPRO 4 UNITS: 100 INJECTION, SOLUTION INTRAVENOUS; SUBCUTANEOUS at 06:17

## 2024-09-28 RX ADMIN — SODIUM CHLORIDE, PRESERVATIVE FREE 10 ML: 5 INJECTION INTRAVENOUS at 20:09

## 2024-09-28 RX ADMIN — IPRATROPIUM BROMIDE AND ALBUTEROL SULFATE 1 DOSE: 2.5; .5 SOLUTION RESPIRATORY (INHALATION) at 11:22

## 2024-09-28 RX ADMIN — ACETAMINOPHEN 650 MG: 325 TABLET ORAL at 09:03

## 2024-09-28 RX ADMIN — WATER 60 MG: 1 INJECTION INTRAMUSCULAR; INTRAVENOUS; SUBCUTANEOUS at 04:57

## 2024-09-28 RX ADMIN — WATER 60 MG: 1 INJECTION INTRAMUSCULAR; INTRAVENOUS; SUBCUTANEOUS at 14:42

## 2024-09-28 RX ADMIN — IPRATROPIUM BROMIDE AND ALBUTEROL SULFATE 1 DOSE: 2.5; .5 SOLUTION RESPIRATORY (INHALATION) at 11:21

## 2024-09-28 RX ADMIN — BUDESONIDE 500 MCG: 0.5 SUSPENSION RESPIRATORY (INHALATION) at 19:49

## 2024-09-28 ASSESSMENT — PAIN DESCRIPTION - LOCATION
LOCATION: SHOULDER
LOCATION: HEAD
LOCATION: CHEST

## 2024-09-28 ASSESSMENT — PAIN SCALES - GENERAL
PAINLEVEL_OUTOF10: 3
PAINLEVEL_OUTOF10: 5
PAINLEVEL_OUTOF10: 4
PAINLEVEL_OUTOF10: 0
PAINLEVEL_OUTOF10: 2
PAINLEVEL_OUTOF10: 1
PAINLEVEL_OUTOF10: 0
PAINLEVEL_OUTOF10: 2

## 2024-09-28 ASSESSMENT — PAIN DESCRIPTION - DESCRIPTORS
DESCRIPTORS: DISCOMFORT;TIGHTNESS
DESCRIPTORS: ACHING

## 2024-09-28 ASSESSMENT — PAIN - FUNCTIONAL ASSESSMENT: PAIN_FUNCTIONAL_ASSESSMENT: ACTIVITIES ARE NOT PREVENTED

## 2024-09-28 ASSESSMENT — PAIN DESCRIPTION - ORIENTATION: ORIENTATION: RIGHT

## 2024-09-29 ENCOUNTER — APPOINTMENT (OUTPATIENT)
Dept: GENERAL RADIOLOGY | Age: 49
DRG: 208 | End: 2024-09-29

## 2024-09-29 LAB
ANION GAP SERPL CALCULATED.3IONS-SCNC: 11 MMOL/L (ref 7–16)
BASOPHILS # BLD: 0.01 K/UL (ref 0–0.2)
BASOPHILS NFR BLD: 0 % (ref 0–2)
BUN SERPL-MCNC: 43 MG/DL (ref 6–20)
CALCIUM SERPL-MCNC: 9.2 MG/DL (ref 8.6–10.2)
CHLORIDE SERPL-SCNC: 101 MMOL/L (ref 98–107)
CO2 SERPL-SCNC: 24 MMOL/L (ref 22–29)
CREAT SERPL-MCNC: 0.8 MG/DL (ref 0.7–1.2)
EOSINOPHIL # BLD: 0 K/UL (ref 0.05–0.5)
EOSINOPHILS RELATIVE PERCENT: 0 % (ref 0–6)
ERYTHROCYTE [DISTWIDTH] IN BLOOD BY AUTOMATED COUNT: 12.5 % (ref 11.5–15)
GFR, ESTIMATED: >90 ML/MIN/1.73M2
GLUCOSE BLD-MCNC: 191 MG/DL (ref 74–99)
GLUCOSE BLD-MCNC: 193 MG/DL (ref 74–99)
GLUCOSE BLD-MCNC: 203 MG/DL (ref 74–99)
GLUCOSE BLD-MCNC: 326 MG/DL (ref 74–99)
GLUCOSE SERPL-MCNC: 238 MG/DL (ref 74–99)
HCT VFR BLD AUTO: 42.1 % (ref 37–54)
HGB BLD-MCNC: 14.3 G/DL (ref 12.5–16.5)
IGG 1: 666 MG/DL (ref 240–1118)
IGG 2: 313 MG/DL (ref 124–549)
IGG 3: 43 MG/DL (ref 21–134)
IGG4 SER-MCNC: 26 MG/DL (ref 1–123)
IMM GRANULOCYTES # BLD AUTO: 0.13 K/UL (ref 0–0.58)
IMM GRANULOCYTES NFR BLD: 1 % (ref 0–5)
LYMPHOCYTES NFR BLD: 0.69 K/UL (ref 1.5–4)
LYMPHOCYTES RELATIVE PERCENT: 7 % (ref 20–42)
MAGNESIUM SERPL-MCNC: 2.3 MG/DL (ref 1.6–2.6)
MCH RBC QN AUTO: 28.6 PG (ref 26–35)
MCHC RBC AUTO-ENTMCNC: 34 G/DL (ref 32–34.5)
MCV RBC AUTO: 84.2 FL (ref 80–99.9)
MONOCYTES NFR BLD: 0.54 K/UL (ref 0.1–0.95)
MONOCYTES NFR BLD: 5 % (ref 2–12)
NEUTROPHILS NFR BLD: 87 % (ref 43–80)
NEUTS SEG NFR BLD: 9.27 K/UL (ref 1.8–7.3)
PHOSPHATE SERPL-MCNC: 3.4 MG/DL (ref 2.5–4.5)
PLATELET # BLD AUTO: 293 K/UL (ref 130–450)
PMV BLD AUTO: 9.2 FL (ref 7–12)
POTASSIUM SERPL-SCNC: 4.7 MMOL/L (ref 3.5–5)
RBC # BLD AUTO: 5 M/UL (ref 3.8–5.8)
SODIUM SERPL-SCNC: 136 MMOL/L (ref 132–146)
WBC OTHER # BLD: 10.6 K/UL (ref 4.5–11.5)

## 2024-09-29 PROCEDURE — 6370000000 HC RX 637 (ALT 250 FOR IP): Performed by: INTERNAL MEDICINE

## 2024-09-29 PROCEDURE — 2000000000 HC ICU R&B

## 2024-09-29 PROCEDURE — 2580000003 HC RX 258: Performed by: INTERNAL MEDICINE

## 2024-09-29 PROCEDURE — 85025 COMPLETE CBC W/AUTO DIFF WBC: CPT

## 2024-09-29 PROCEDURE — 94640 AIRWAY INHALATION TREATMENT: CPT

## 2024-09-29 PROCEDURE — 6360000002 HC RX W HCPCS: Performed by: INTERNAL MEDICINE

## 2024-09-29 PROCEDURE — 6370000000 HC RX 637 (ALT 250 FOR IP)

## 2024-09-29 PROCEDURE — 82962 GLUCOSE BLOOD TEST: CPT

## 2024-09-29 PROCEDURE — 6360000002 HC RX W HCPCS

## 2024-09-29 PROCEDURE — 83735 ASSAY OF MAGNESIUM: CPT

## 2024-09-29 PROCEDURE — 2580000003 HC RX 258

## 2024-09-29 PROCEDURE — 94660 CPAP INITIATION&MGMT: CPT

## 2024-09-29 PROCEDURE — 99232 SBSQ HOSP IP/OBS MODERATE 35: CPT | Performed by: INTERNAL MEDICINE

## 2024-09-29 PROCEDURE — 80048 BASIC METABOLIC PNL TOTAL CA: CPT

## 2024-09-29 PROCEDURE — 99222 1ST HOSP IP/OBS MODERATE 55: CPT | Performed by: INTERNAL MEDICINE

## 2024-09-29 PROCEDURE — 84100 ASSAY OF PHOSPHORUS: CPT

## 2024-09-29 RX ORDER — INSULIN GLARGINE 100 [IU]/ML
9 INJECTION, SOLUTION SUBCUTANEOUS NIGHTLY
Status: DISCONTINUED | OUTPATIENT
Start: 2024-09-29 | End: 2024-09-30

## 2024-09-29 RX ORDER — INSULIN LISPRO 100 [IU]/ML
0-12 INJECTION, SOLUTION INTRAVENOUS; SUBCUTANEOUS
Status: DISCONTINUED | OUTPATIENT
Start: 2024-09-29 | End: 2024-10-02 | Stop reason: HOSPADM

## 2024-09-29 RX ADMIN — ENOXAPARIN SODIUM 40 MG: 100 INJECTION SUBCUTANEOUS at 08:11

## 2024-09-29 RX ADMIN — PANTOPRAZOLE SODIUM 40 MG: 40 TABLET, DELAYED RELEASE ORAL at 05:45

## 2024-09-29 RX ADMIN — IPRATROPIUM BROMIDE AND ALBUTEROL SULFATE 1 DOSE: 2.5; .5 SOLUTION RESPIRATORY (INHALATION) at 15:20

## 2024-09-29 RX ADMIN — WATER 60 MG: 1 INJECTION INTRAMUSCULAR; INTRAVENOUS; SUBCUTANEOUS at 20:03

## 2024-09-29 RX ADMIN — INSULIN LISPRO 4 UNITS: 100 INJECTION, SOLUTION INTRAVENOUS; SUBCUTANEOUS at 18:16

## 2024-09-29 RX ADMIN — BUDESONIDE 500 MCG: 0.5 SUSPENSION RESPIRATORY (INHALATION) at 08:15

## 2024-09-29 RX ADMIN — INSULIN GLARGINE 9 UNITS: 100 INJECTION, SOLUTION SUBCUTANEOUS at 20:01

## 2024-09-29 RX ADMIN — ACETAMINOPHEN 650 MG: 325 TABLET ORAL at 08:12

## 2024-09-29 RX ADMIN — WATER 60 MG: 1 INJECTION INTRAMUSCULAR; INTRAVENOUS; SUBCUTANEOUS at 12:34

## 2024-09-29 RX ADMIN — INSULIN LISPRO 8 UNITS: 100 INJECTION, SOLUTION INTRAVENOUS; SUBCUTANEOUS at 12:33

## 2024-09-29 RX ADMIN — SODIUM CHLORIDE, PRESERVATIVE FREE 10 ML: 5 INJECTION INTRAVENOUS at 08:14

## 2024-09-29 RX ADMIN — Medication 5 MG: at 21:03

## 2024-09-29 RX ADMIN — ROSUVASTATIN 5 MG: 10 TABLET, FILM COATED ORAL at 20:02

## 2024-09-29 RX ADMIN — WATER 60 MG: 1 INJECTION INTRAMUSCULAR; INTRAVENOUS; SUBCUTANEOUS at 05:45

## 2024-09-29 RX ADMIN — IPRATROPIUM BROMIDE AND ALBUTEROL SULFATE 1 DOSE: 2.5; .5 SOLUTION RESPIRATORY (INHALATION) at 12:52

## 2024-09-29 RX ADMIN — SODIUM CHLORIDE, PRESERVATIVE FREE 10 ML: 5 INJECTION INTRAVENOUS at 20:09

## 2024-09-29 RX ADMIN — IPRATROPIUM BROMIDE AND ALBUTEROL SULFATE 1 DOSE: 2.5; .5 SOLUTION RESPIRATORY (INHALATION) at 08:15

## 2024-09-29 RX ADMIN — BUDESONIDE 500 MCG: 0.5 SUSPENSION RESPIRATORY (INHALATION) at 20:29

## 2024-09-29 RX ADMIN — LOSARTAN POTASSIUM 25 MG: 25 TABLET, FILM COATED ORAL at 08:12

## 2024-09-29 RX ADMIN — IPRATROPIUM BROMIDE AND ALBUTEROL SULFATE 1 DOSE: 2.5; .5 SOLUTION RESPIRATORY (INHALATION) at 20:29

## 2024-09-29 RX ADMIN — MONTELUKAST 10 MG: 10 TABLET, FILM COATED ORAL at 20:09

## 2024-09-29 ASSESSMENT — PAIN SCALES - GENERAL
PAINLEVEL_OUTOF10: 4
PAINLEVEL_OUTOF10: 0
PAINLEVEL_OUTOF10: 0
PAINLEVEL_OUTOF10: 2

## 2024-09-29 ASSESSMENT — PAIN DESCRIPTION - LOCATION: LOCATION: HEAD

## 2024-09-29 ASSESSMENT — PAIN DESCRIPTION - DESCRIPTORS: DESCRIPTORS: ACHING

## 2024-09-30 LAB
ALPHA-1 ANTITRYPSIN PHENOTYPE: NORMAL
ALPHA-1 ANTITRYPSIN: 200 MG/DL (ref 90–200)
ANION GAP SERPL CALCULATED.3IONS-SCNC: 14 MMOL/L (ref 7–16)
BASOPHILS # BLD: 0.04 K/UL (ref 0–0.2)
BASOPHILS NFR BLD: 1 % (ref 0–2)
BUN SERPL-MCNC: 37 MG/DL (ref 6–20)
CALCIUM SERPL-MCNC: 9.7 MG/DL (ref 8.6–10.2)
CHLORIDE SERPL-SCNC: 98 MMOL/L (ref 98–107)
CO2 SERPL-SCNC: 23 MMOL/L (ref 22–29)
CREAT SERPL-MCNC: 0.8 MG/DL (ref 0.7–1.2)
EOSINOPHIL # BLD: 0.01 K/UL (ref 0.05–0.5)
EOSINOPHILS RELATIVE PERCENT: 0 % (ref 0–6)
ERYTHROCYTE [DISTWIDTH] IN BLOOD BY AUTOMATED COUNT: 12.1 % (ref 11.5–15)
GFR, ESTIMATED: >90 ML/MIN/1.73M2
GLUCOSE BLD-MCNC: 227 MG/DL (ref 74–99)
GLUCOSE BLD-MCNC: 246 MG/DL (ref 74–99)
GLUCOSE BLD-MCNC: 251 MG/DL (ref 74–99)
GLUCOSE BLD-MCNC: 256 MG/DL (ref 74–99)
GLUCOSE SERPL-MCNC: 251 MG/DL (ref 74–99)
HCT VFR BLD AUTO: 47.3 % (ref 37–54)
HGB BLD-MCNC: 15.9 G/DL (ref 12.5–16.5)
IMM GRANULOCYTES # BLD AUTO: 0.23 K/UL (ref 0–0.58)
IMM GRANULOCYTES NFR BLD: 3 % (ref 0–5)
LYMPHOCYTES NFR BLD: 1.09 K/UL (ref 1.5–4)
LYMPHOCYTES RELATIVE PERCENT: 13 % (ref 20–42)
MAGNESIUM SERPL-MCNC: 2.1 MG/DL (ref 1.6–2.6)
MCH RBC QN AUTO: 28.2 PG (ref 26–35)
MCHC RBC AUTO-ENTMCNC: 33.6 G/DL (ref 32–34.5)
MCV RBC AUTO: 84 FL (ref 80–99.9)
MONOCYTES NFR BLD: 0.56 K/UL (ref 0.1–0.95)
MONOCYTES NFR BLD: 7 % (ref 2–12)
NEUTROPHILS NFR BLD: 77 % (ref 43–80)
NEUTS SEG NFR BLD: 6.5 K/UL (ref 1.8–7.3)
PHOSPHATE SERPL-MCNC: 3.8 MG/DL (ref 2.5–4.5)
PLATELET # BLD AUTO: 331 K/UL (ref 130–450)
PMV BLD AUTO: 8.9 FL (ref 7–12)
POTASSIUM SERPL-SCNC: 4.5 MMOL/L (ref 3.5–5)
RBC # BLD AUTO: 5.63 M/UL (ref 3.8–5.8)
SODIUM SERPL-SCNC: 135 MMOL/L (ref 132–146)
WBC OTHER # BLD: 8.4 K/UL (ref 4.5–11.5)

## 2024-09-30 PROCEDURE — 2580000003 HC RX 258: Performed by: INTERNAL MEDICINE

## 2024-09-30 PROCEDURE — 2580000003 HC RX 258

## 2024-09-30 PROCEDURE — 6370000000 HC RX 637 (ALT 250 FOR IP)

## 2024-09-30 PROCEDURE — 84100 ASSAY OF PHOSPHORUS: CPT

## 2024-09-30 PROCEDURE — 6370000000 HC RX 637 (ALT 250 FOR IP): Performed by: INTERNAL MEDICINE

## 2024-09-30 PROCEDURE — 83735 ASSAY OF MAGNESIUM: CPT

## 2024-09-30 PROCEDURE — 82962 GLUCOSE BLOOD TEST: CPT

## 2024-09-30 PROCEDURE — 80048 BASIC METABOLIC PNL TOTAL CA: CPT

## 2024-09-30 PROCEDURE — 99232 SBSQ HOSP IP/OBS MODERATE 35: CPT | Performed by: INTERNAL MEDICINE

## 2024-09-30 PROCEDURE — 99291 CRITICAL CARE FIRST HOUR: CPT | Performed by: INTERNAL MEDICINE

## 2024-09-30 PROCEDURE — 99222 1ST HOSP IP/OBS MODERATE 55: CPT | Performed by: INTERNAL MEDICINE

## 2024-09-30 PROCEDURE — 6360000002 HC RX W HCPCS

## 2024-09-30 PROCEDURE — 94660 CPAP INITIATION&MGMT: CPT

## 2024-09-30 PROCEDURE — 2060000000 HC ICU INTERMEDIATE R&B

## 2024-09-30 PROCEDURE — 6360000002 HC RX W HCPCS: Performed by: INTERNAL MEDICINE

## 2024-09-30 PROCEDURE — 85025 COMPLETE CBC W/AUTO DIFF WBC: CPT

## 2024-09-30 PROCEDURE — 94640 AIRWAY INHALATION TREATMENT: CPT

## 2024-09-30 RX ORDER — PREDNISONE 20 MG/1
40 TABLET ORAL ONCE
Status: COMPLETED | OUTPATIENT
Start: 2024-09-30 | End: 2024-09-30

## 2024-09-30 RX ORDER — PREDNISONE 20 MG/1
40 TABLET ORAL DAILY
Status: DISCONTINUED | OUTPATIENT
Start: 2024-10-01 | End: 2024-10-01

## 2024-09-30 RX ORDER — PREDNISONE 20 MG/1
40 TABLET ORAL ONCE
Status: DISCONTINUED | OUTPATIENT
Start: 2024-10-01 | End: 2024-09-30

## 2024-09-30 RX ORDER — INSULIN LISPRO 100 [IU]/ML
7 INJECTION, SOLUTION INTRAVENOUS; SUBCUTANEOUS
Status: DISCONTINUED | OUTPATIENT
Start: 2024-09-30 | End: 2024-10-02

## 2024-09-30 RX ORDER — INSULIN GLARGINE 100 [IU]/ML
15 INJECTION, SOLUTION SUBCUTANEOUS NIGHTLY
Status: DISCONTINUED | OUTPATIENT
Start: 2024-09-30 | End: 2024-10-01

## 2024-09-30 RX ADMIN — IPRATROPIUM BROMIDE AND ALBUTEROL SULFATE 1 DOSE: 2.5; .5 SOLUTION RESPIRATORY (INHALATION) at 19:53

## 2024-09-30 RX ADMIN — INSULIN LISPRO 6 UNITS: 100 INJECTION, SOLUTION INTRAVENOUS; SUBCUTANEOUS at 17:52

## 2024-09-30 RX ADMIN — SODIUM CHLORIDE, PRESERVATIVE FREE 10 ML: 5 INJECTION INTRAVENOUS at 20:51

## 2024-09-30 RX ADMIN — LOSARTAN POTASSIUM 25 MG: 25 TABLET, FILM COATED ORAL at 09:25

## 2024-09-30 RX ADMIN — IPRATROPIUM BROMIDE AND ALBUTEROL SULFATE 1 DOSE: 2.5; .5 SOLUTION RESPIRATORY (INHALATION) at 08:16

## 2024-09-30 RX ADMIN — PREDNISONE 40 MG: 20 TABLET ORAL at 15:11

## 2024-09-30 RX ADMIN — Medication 5 MG: at 20:49

## 2024-09-30 RX ADMIN — ROSUVASTATIN 5 MG: 10 TABLET, FILM COATED ORAL at 20:49

## 2024-09-30 RX ADMIN — INSULIN LISPRO 4 UNITS: 100 INJECTION, SOLUTION INTRAVENOUS; SUBCUTANEOUS at 12:19

## 2024-09-30 RX ADMIN — INSULIN GLARGINE 15 UNITS: 100 INJECTION, SOLUTION SUBCUTANEOUS at 20:50

## 2024-09-30 RX ADMIN — BUDESONIDE 500 MCG: 0.5 SUSPENSION RESPIRATORY (INHALATION) at 19:53

## 2024-09-30 RX ADMIN — BUDESONIDE 500 MCG: 0.5 SUSPENSION RESPIRATORY (INHALATION) at 08:16

## 2024-09-30 RX ADMIN — IPRATROPIUM BROMIDE AND ALBUTEROL SULFATE 1 DOSE: 2.5; .5 SOLUTION RESPIRATORY (INHALATION) at 16:06

## 2024-09-30 RX ADMIN — IPRATROPIUM BROMIDE AND ALBUTEROL SULFATE 1 DOSE: 2.5; .5 SOLUTION RESPIRATORY (INHALATION) at 11:19

## 2024-09-30 RX ADMIN — PANTOPRAZOLE SODIUM 40 MG: 40 TABLET, DELAYED RELEASE ORAL at 05:26

## 2024-09-30 RX ADMIN — WATER 60 MG: 1 INJECTION INTRAMUSCULAR; INTRAVENOUS; SUBCUTANEOUS at 05:26

## 2024-09-30 RX ADMIN — INSULIN LISPRO 6 UNITS: 100 INJECTION, SOLUTION INTRAVENOUS; SUBCUTANEOUS at 09:26

## 2024-09-30 RX ADMIN — INSULIN LISPRO 7 UNITS: 100 INJECTION, SOLUTION INTRAVENOUS; SUBCUTANEOUS at 17:52

## 2024-09-30 RX ADMIN — SODIUM CHLORIDE, PRESERVATIVE FREE 10 ML: 5 INJECTION INTRAVENOUS at 09:00

## 2024-09-30 ASSESSMENT — PAIN SCALES - GENERAL
PAINLEVEL_OUTOF10: 0
PAINLEVEL_OUTOF10: 0

## 2024-09-30 NOTE — CONSULTS
ENDOCRINOLOGY INITIAL CONSULTATION NOTE      Date of admission: 9/25/2024  Date of service: 9/27/2024  Admitting physician: Fariha Arriola DO   Primary Care Physician: Mayda Pcp  Consultant physician: Juan Carlos Marquez MD     Reason for the consultation:  Uncontrolled DM    History of Present Illness:  The history is provided by the patient. Accuracy of the patient data is excellent    Sultan Abarca is a very pleasant 49 y.o. old male with PMH of asthma, polysubstance abuse and other listed below admitted to [facility] on 9/25/2024 because of high BP and tachycardia, endocrine service was consulted for diabetes management.  Admission labs , AG 13, Bicarb 23, Cr 1.1, K 5.1     Prior to admission  The patient denies h/o DM prior to this admission   Lab Results   Component Value Date/Time    LABA1C 7.9 09/26/2024 03:57 AM       Inpatient diet:   Carb Restricted diet     Point of care glucose monitoring   (Independently reviewed)   Recent Labs     09/26/24  0553 09/26/24  0630 09/26/24  1835 09/26/24  2124 09/27/24  0036 09/27/24  0314 09/27/24  0604 09/27/24  1446   POCGLU 207* 178* 171* 209* 194* 212* 219* 184*       Past medical history:   No past medical history on file.    Past surgical history:  No past surgical history on file.    Social history:   Tobacco:   has no history on file for tobacco use.  Alcohol:   has no history on file for alcohol use.  Drugs:   has no history on file for drug use.    Family history:    No family history on file.    Allergy and drug reactions:   No Known Allergies    Scheduled Meds:   cefTRIAXone (ROCEPHIN) IV  2,000 mg IntraVENous Q24H    insulin lispro  0-12 Units SubCUTAneous Q6H    pantoprazole (PROTONIX) 40 mg in sodium chloride (PF) 0.9 % 10 mL injection  40 mg IntraVENous Daily    methylPREDNISolone  60 mg IntraVENous Q6H    sodium chloride flush  5-40 mL IntraVENous 2 times per day    enoxaparin  40 mg SubCUTAneous Daily    budesonide  0.5 mg Nebulization BID RT    
Reason for consult as issues with depression and patient wants to follow up with psych.  Patients with concerns for depression are recommended  to follow-up on an outpatient basis.  Inpatient psychiatry is unable to follow patients outpatient as our services are for inpatient.  Would recommend upon discharge  set patient up with an outpatient mental health agency.  If there are any acute inpatient psychiatric needs or concerns such as if patient is suicidal, homicidal, psychotic or manic please reconsult psychiatry at that time.  Any questions or concerns please let us know  
tube.           XR CHEST PORTABLE   Final Result   1. Alveolar opacities in the peripheral right lower chest compatible with   pneumonia in the proper clinical setting.           XR CHEST PORTABLE    (Results Pending)      - CT Head 10/9/2020:  1.  No CT evidence of pulmonary embolism.     2.  Infiltrative right middle lobe opacity and right lower lobe opacity   with a small right hilar lymph node and paratracheal and subcarinal lymph   node presumably reactive related to pneumonia however close interval   follow-up in 3-4 weeks is recommended following treatment to document   resolution and exclude an underlying pathologic process.      Physical Examination:  Vitals:   Vitals          Vitals:     09/26/24 0954 09/26/24 0955 09/26/24 0956 09/26/24 1000   BP:       116/62   Pulse: (!) 103 (!) 104 (!) 103 (!) 103   Resp: 22 22 19 22   Temp:           TempSrc:           SpO2: 100% 100% 100% 99%   Weight:           Height:                 General: No Acute Distress  HEENT: normocephalic, atraumatic  Abdomen: soft, NT, ND  CVS: RRR, S1, S2, No S3 or S4  Respiratory: decreased breath sounds at lung bases, no focal wheezes noted  Extremities: no clubbing/cyanosis/edema  Neuro: sedated, intubated     ASSESSMENT:  Acute Hypoxic and Hypercapnic Respiratory Failure - secondary to Status Asthmaticus   Lactic Acidosis   Status Asthmaticus  Hyperkalemia - secondary to acidosis  H/O Polysubstance Abuse   Hypophosphatemia   H/O Hodgkin's Lymphoma per chart      In addition the following applies:     Check: serial labs   Medication Alterations: Ketamine   Procedures:   Imaging:  New Consultations:  VENT: ACVC (DAY 2) 22/500/50/8, Et Tube 6.5  Ppeak: 41  Pplateau: 19  Compliance: 49     Access: Peripheral   Consults: N/A  Drips: Versed, Propofol  DVT Phylaxis: Lovenox   GI Prophylaxis: PPI  Nutrition: NPO  ABX: Vancomycin, Cefepime      - ketamine drip   - D/C Brovana  - IV Steroids to q 6 hours 60 mg   - 3 amps bicarbonate   - full

## 2024-09-30 NOTE — CARE COORDINATION
Care Coordination: LOS 5 day.  Met with pt at bedside to discuss transition of care needs. Pt was from [facility].  He came from [place] where he states he was homeless- works at [employer] and uses that as his mailing address. He states [facility] was working with him for a residential program were he could work during day and stay at for 6 weeks.  I called Nicci at [facility] - he spoke to pt on speaker phone in room. Plan will be for nicci to arrange  at discharge and arrange next destination for pt.  Pt is agreeable and does have number for [facility].  Clinical faxed to Nicci [phone]. Nicci has my contact information  ADDENDUM: PBO met with pt, he works at [employer], makes 15.00 per hour, 1800.00 per month. Qualifies for HFA but not medicaid pending.    Electronically signed by Sarina Ceja RN on 9/30/2024 at 1:19 PM

## 2024-10-01 ENCOUNTER — APPOINTMENT (OUTPATIENT)
Dept: GENERAL RADIOLOGY | Age: 49
DRG: 208 | End: 2024-10-01

## 2024-10-01 LAB
A ALTERNATA IGE QN: <0.1 KU/L (ref 0–0.34)
A FUMIGATUS IGE QN: <0.1 KU/L (ref 0–0.34)
ALLERGEN BIRCH IGE: NORMAL KU/L (ref 0–0.34)
ANION GAP SERPL CALCULATED.3IONS-SCNC: 15 MMOL/L (ref 7–16)
BASOPHILS # BLD: 0 K/UL (ref 0–0.2)
BASOPHILS NFR BLD: 0 % (ref 0–2)
BERMUDA GRASS IGE QN: <0.1 KU/L (ref 0–0.34)
BOXELDER IGE QN: <0.1 KU/L (ref 0–0.34)
BUN SERPL-MCNC: 36 MG/DL (ref 6–20)
C HERBARUM IGE QN: <0.1 KUL/L (ref 0–0.34)
CALCIUM SERPL-MCNC: 9.9 MG/DL (ref 8.6–10.2)
CALIF WALNUT POLN IGE QN: <0.1 KU/L (ref 0–0.34)
CAT DANDER IGE QN: <0.1 KU/L (ref 0–0.34)
CHLORIDE SERPL-SCNC: 98 MMOL/L (ref 98–107)
CMN PIGWEED IGE QN: NORMAL KU/L (ref 0–0.34)
CO2 SERPL-SCNC: 23 MMOL/L (ref 22–29)
COMMON RAGWEED IGE QN: NORMAL KU/L (ref 0–0.34)
COTTONWOOD IGE QN: <0.1 KU/L (ref 0–0.34)
CREAT SERPL-MCNC: 0.8 MG/DL (ref 0.7–1.2)
D FARINAE IGE QN: <0.1 KU/L (ref 0–0.34)
D PTERONYSS IGE QN: <0.1 KU/L (ref 0–0.34)
DOG DANDER IGE QN: <0.1 KU/L (ref 0–0.34)
EKG ATRIAL RATE: 105 BPM
EKG P AXIS: 77 DEGREES
EKG P-R INTERVAL: 166 MS
EKG Q-T INTERVAL: 362 MS
EKG QRS DURATION: 110 MS
EKG QTC CALCULATION (BAZETT): 478 MS
EKG R AXIS: 16 DEGREES
EKG T AXIS: 70 DEGREES
EKG VENTRICULAR RATE: 105 BPM
EOSINOPHIL # BLD: 0.25 K/UL (ref 0.05–0.5)
EOSINOPHILS RELATIVE PERCENT: 3 % (ref 0–6)
ERYTHROCYTE [DISTWIDTH] IN BLOOD BY AUTOMATED COUNT: 12.1 % (ref 11.5–15)
GFR, ESTIMATED: >90 ML/MIN/1.73M2
GLUCOSE BLD-MCNC: 194 MG/DL (ref 74–99)
GLUCOSE BLD-MCNC: 199 MG/DL (ref 74–99)
GLUCOSE BLD-MCNC: 247 MG/DL (ref 74–99)
GLUCOSE BLD-MCNC: 302 MG/DL (ref 74–99)
GLUCOSE SERPL-MCNC: 206 MG/DL (ref 74–99)
HCT VFR BLD AUTO: 49.3 % (ref 37–54)
HGB BLD-MCNC: 16.9 G/DL (ref 12.5–16.5)
IGE SERPL-ACNC: 63 IU/ML (ref 0–100)
LONDON PLANE IGE QN: <0.1 KU/L (ref 0–0.34)
LYMPHOCYTES NFR BLD: 2.62 K/UL (ref 1.5–4)
LYMPHOCYTES RELATIVE PERCENT: 28 % (ref 20–42)
M RACEMOSUS IGE QN: <0.1 KU/L (ref 0–0.34)
MAGNESIUM SERPL-MCNC: 2.1 MG/DL (ref 1.6–2.6)
MCH RBC QN AUTO: 28.4 PG (ref 26–35)
MCHC RBC AUTO-ENTMCNC: 34.3 G/DL (ref 32–34.5)
MCV RBC AUTO: 82.9 FL (ref 80–99.9)
METAMYELOCYTES ABSOLUTE COUNT: 0.08 K/UL (ref 0–0.12)
METAMYELOCYTES: 1 % (ref 0–1)
MONOCYTES NFR BLD: 0.65 K/UL (ref 0.1–0.95)
MONOCYTES NFR BLD: 7 % (ref 2–12)
MOUSE EPITH IGE QN: <0.1 KU/L (ref 0–0.34)
MT JUNIPER IGE QN: NORMAL KU/L (ref 0–0.34)
NEUTROPHILS NFR BLD: 62 % (ref 43–80)
NEUTS SEG NFR BLD: 5.8 K/UL (ref 1.8–7.3)
NUCLEATED RED BLOOD CELLS: 1 PER 100 WBC
P NOTATUM IGE QN: <0.1 KU/L (ref 0–0.34)
PECAN/HICK TREE IGE QN: NORMAL KU/L (ref 0–0.34)
PHOSPHATE SERPL-MCNC: 3.1 MG/DL (ref 2.5–4.5)
PLATELET # BLD AUTO: 365 K/UL (ref 130–450)
PMV BLD AUTO: 9 FL (ref 7–12)
POTASSIUM SERPL-SCNC: 3.9 MMOL/L (ref 3.5–5)
RBC # BLD AUTO: 5.95 M/UL (ref 3.8–5.8)
RBC # BLD: ABNORMAL 10*6/UL
RBC # BLD: ABNORMAL 10*6/UL
ROACH IGE QN: <0.1 KU/L (ref 0–0.34)
SALTWORT IGE QN: NORMAL KU/L (ref 0–0.34)
SHEEP SORREL IGE QN: NORMAL KU/L (ref 0–0.34)
SODIUM SERPL-SCNC: 136 MMOL/L (ref 132–146)
TIMOTHY IGE QN: <0.1 KU/L (ref 0–0.34)
WBC OTHER # BLD: 9.4 K/UL (ref 4.5–11.5)
WHITE ASH IGE QN: <0.1 KU/L (ref 0–0.34)
WHITE ELM IGE QN: NORMAL KU/L (ref 0–0.34)
WHITE MULBERRY IGE QN: NORMAL KU/L (ref 0–0.34)
WHITE OAK IGE QN: NORMAL KU/L (ref 0–0.34)

## 2024-10-01 PROCEDURE — 84100 ASSAY OF PHOSPHORUS: CPT

## 2024-10-01 PROCEDURE — 6370000000 HC RX 637 (ALT 250 FOR IP)

## 2024-10-01 PROCEDURE — 6360000002 HC RX W HCPCS: Performed by: INTERNAL MEDICINE

## 2024-10-01 PROCEDURE — 6370000000 HC RX 637 (ALT 250 FOR IP): Performed by: INTERNAL MEDICINE

## 2024-10-01 PROCEDURE — 85025 COMPLETE CBC W/AUTO DIFF WBC: CPT

## 2024-10-01 PROCEDURE — 1200000000 HC SEMI PRIVATE

## 2024-10-01 PROCEDURE — 94640 AIRWAY INHALATION TREATMENT: CPT

## 2024-10-01 PROCEDURE — 36415 COLL VENOUS BLD VENIPUNCTURE: CPT

## 2024-10-01 PROCEDURE — 99233 SBSQ HOSP IP/OBS HIGH 50: CPT | Performed by: INTERNAL MEDICINE

## 2024-10-01 PROCEDURE — 80048 BASIC METABOLIC PNL TOTAL CA: CPT

## 2024-10-01 PROCEDURE — 83735 ASSAY OF MAGNESIUM: CPT

## 2024-10-01 PROCEDURE — 2580000003 HC RX 258: Performed by: INTERNAL MEDICINE

## 2024-10-01 PROCEDURE — 99232 SBSQ HOSP IP/OBS MODERATE 35: CPT | Performed by: FAMILY MEDICINE

## 2024-10-01 PROCEDURE — 99232 SBSQ HOSP IP/OBS MODERATE 35: CPT | Performed by: INTERNAL MEDICINE

## 2024-10-01 PROCEDURE — 82962 GLUCOSE BLOOD TEST: CPT

## 2024-10-01 PROCEDURE — 6370000000 HC RX 637 (ALT 250 FOR IP): Performed by: FAMILY MEDICINE

## 2024-10-01 PROCEDURE — 71045 X-RAY EXAM CHEST 1 VIEW: CPT

## 2024-10-01 RX ORDER — POLYVINYL ALCOHOL 14 MG/ML
1 SOLUTION/ DROPS OPHTHALMIC PRN
COMMUNITY

## 2024-10-01 RX ORDER — PANTOPRAZOLE SODIUM 40 MG/1
40 TABLET, DELAYED RELEASE ORAL DAILY
COMMUNITY

## 2024-10-01 RX ORDER — LIDOCAINE 4 G/G
1 PATCH TOPICAL DAILY
Status: DISCONTINUED | OUTPATIENT
Start: 2024-10-01 | End: 2024-10-02 | Stop reason: HOSPADM

## 2024-10-01 RX ORDER — BUDESONIDE AND FORMOTEROL FUMARATE DIHYDRATE 80; 4.5 UG/1; UG/1
2 AEROSOL RESPIRATORY (INHALATION) 2 TIMES DAILY
Status: ON HOLD | COMMUNITY
End: 2024-10-02 | Stop reason: HOSPADM

## 2024-10-01 RX ORDER — BUPROPION HYDROCHLORIDE 150 MG/1
150 TABLET, EXTENDED RELEASE ORAL 2 TIMES DAILY
Status: DISCONTINUED | OUTPATIENT
Start: 2024-10-01 | End: 2024-10-02 | Stop reason: HOSPADM

## 2024-10-01 RX ORDER — PREDNISONE 5 MG/1
5 TABLET ORAL DAILY
Status: ON HOLD | COMMUNITY
End: 2024-10-02 | Stop reason: HOSPADM

## 2024-10-01 RX ORDER — ROSUVASTATIN CALCIUM 5 MG/1
5 TABLET, COATED ORAL DAILY
COMMUNITY

## 2024-10-01 RX ORDER — INSULIN GLARGINE 100 [IU]/ML
24 INJECTION, SOLUTION SUBCUTANEOUS NIGHTLY
Status: DISCONTINUED | OUTPATIENT
Start: 2024-10-01 | End: 2024-10-02

## 2024-10-01 RX ORDER — FUROSEMIDE 20 MG
20 TABLET ORAL
Status: COMPLETED | OUTPATIENT
Start: 2024-10-01 | End: 2024-10-01

## 2024-10-01 RX ORDER — ALBUTEROL SULFATE 90 UG/1
2 INHALANT RESPIRATORY (INHALATION) EVERY 6 HOURS PRN
Status: ON HOLD | COMMUNITY
End: 2024-10-02

## 2024-10-01 RX ORDER — BUPROPION HYDROCHLORIDE 150 MG/1
150 TABLET, EXTENDED RELEASE ORAL 2 TIMES DAILY
Status: ON HOLD | COMMUNITY
End: 2024-10-02

## 2024-10-01 RX ORDER — MONTELUKAST SODIUM 10 MG/1
10 TABLET ORAL NIGHTLY
Status: DISCONTINUED | OUTPATIENT
Start: 2024-10-01 | End: 2024-10-02 | Stop reason: HOSPADM

## 2024-10-01 RX ORDER — INSULIN GLARGINE 100 [IU]/ML
12 INJECTION, SOLUTION SUBCUTANEOUS NIGHTLY
Status: ON HOLD | COMMUNITY
End: 2024-10-02 | Stop reason: HOSPADM

## 2024-10-01 RX ORDER — LIDOCAINE 4 G/G
1 PATCH TOPICAL DAILY
COMMUNITY

## 2024-10-01 RX ORDER — PREDNISONE 20 MG/1
20 TABLET ORAL DAILY
Status: DISCONTINUED | OUTPATIENT
Start: 2024-10-02 | End: 2024-10-02 | Stop reason: HOSPADM

## 2024-10-01 RX ORDER — LANOLIN ALCOHOL/MO/W.PET/CERES
5 CREAM (GRAM) TOPICAL NIGHTLY PRN
COMMUNITY

## 2024-10-01 RX ORDER — PANTOPRAZOLE SODIUM 40 MG/1
40 TABLET, DELAYED RELEASE ORAL DAILY
Status: DISCONTINUED | OUTPATIENT
Start: 2024-10-01 | End: 2024-10-02 | Stop reason: HOSPADM

## 2024-10-01 RX ORDER — LOSARTAN POTASSIUM 25 MG/1
25 TABLET ORAL DAILY
Status: ON HOLD | COMMUNITY
End: 2024-10-02 | Stop reason: HOSPADM

## 2024-10-01 RX ORDER — MONTELUKAST SODIUM 10 MG/1
10 TABLET ORAL NIGHTLY
COMMUNITY

## 2024-10-01 RX ORDER — IPRATROPIUM BROMIDE AND ALBUTEROL SULFATE 2.5; .5 MG/3ML; MG/3ML
1 SOLUTION RESPIRATORY (INHALATION) EVERY 4 HOURS
COMMUNITY

## 2024-10-01 RX ORDER — MECOBALAMIN 5000 MCG
5 TABLET,DISINTEGRATING ORAL NIGHTLY PRN
Status: DISCONTINUED | OUTPATIENT
Start: 2024-10-01 | End: 2024-10-02 | Stop reason: HOSPADM

## 2024-10-01 RX ORDER — ROSUVASTATIN CALCIUM 10 MG/1
5 TABLET, COATED ORAL NIGHTLY
Status: DISCONTINUED | OUTPATIENT
Start: 2024-10-01 | End: 2024-10-02 | Stop reason: HOSPADM

## 2024-10-01 RX ADMIN — LOSARTAN POTASSIUM 25 MG: 25 TABLET, FILM COATED ORAL at 08:21

## 2024-10-01 RX ADMIN — BUDESONIDE 500 MCG: 0.5 SUSPENSION RESPIRATORY (INHALATION) at 20:25

## 2024-10-01 RX ADMIN — MONTELUKAST SODIUM 10 MG: 10 TABLET, COATED ORAL at 22:13

## 2024-10-01 RX ADMIN — INSULIN LISPRO 2 UNITS: 100 INJECTION, SOLUTION INTRAVENOUS; SUBCUTANEOUS at 17:00

## 2024-10-01 RX ADMIN — SODIUM CHLORIDE, PRESERVATIVE FREE 10 ML: 5 INJECTION INTRAVENOUS at 08:34

## 2024-10-01 RX ADMIN — FUROSEMIDE 20 MG: 20 TABLET ORAL at 12:27

## 2024-10-01 RX ADMIN — INSULIN LISPRO 4 UNITS: 100 INJECTION, SOLUTION INTRAVENOUS; SUBCUTANEOUS at 12:26

## 2024-10-01 RX ADMIN — IPRATROPIUM BROMIDE AND ALBUTEROL SULFATE 1 DOSE: 2.5; .5 SOLUTION RESPIRATORY (INHALATION) at 15:46

## 2024-10-01 RX ADMIN — INSULIN LISPRO 7 UNITS: 100 INJECTION, SOLUTION INTRAVENOUS; SUBCUTANEOUS at 12:29

## 2024-10-01 RX ADMIN — INSULIN LISPRO 7 UNITS: 100 INJECTION, SOLUTION INTRAVENOUS; SUBCUTANEOUS at 08:25

## 2024-10-01 RX ADMIN — BUDESONIDE 500 MCG: 0.5 SUSPENSION RESPIRATORY (INHALATION) at 08:02

## 2024-10-01 RX ADMIN — SODIUM CHLORIDE, PRESERVATIVE FREE 10 ML: 5 INJECTION INTRAVENOUS at 22:15

## 2024-10-01 RX ADMIN — PREDNISONE 40 MG: 20 TABLET ORAL at 08:21

## 2024-10-01 RX ADMIN — METFORMIN HYDROCHLORIDE 500 MG: 500 TABLET ORAL at 17:01

## 2024-10-01 RX ADMIN — INSULIN GLARGINE 24 UNITS: 100 INJECTION, SOLUTION SUBCUTANEOUS at 22:13

## 2024-10-01 RX ADMIN — Medication 5 MG: at 22:12

## 2024-10-01 RX ADMIN — IPRATROPIUM BROMIDE AND ALBUTEROL SULFATE 1 DOSE: 2.5; .5 SOLUTION RESPIRATORY (INHALATION) at 13:26

## 2024-10-01 RX ADMIN — PANTOPRAZOLE SODIUM 40 MG: 40 TABLET, DELAYED RELEASE ORAL at 05:49

## 2024-10-01 RX ADMIN — BUPROPION HYDROCHLORIDE 150 MG: 150 TABLET, FILM COATED, EXTENDED RELEASE ORAL at 22:12

## 2024-10-01 RX ADMIN — IPRATROPIUM BROMIDE AND ALBUTEROL SULFATE 1 DOSE: 2.5; .5 SOLUTION RESPIRATORY (INHALATION) at 20:25

## 2024-10-01 RX ADMIN — INSULIN LISPRO 4 UNITS: 100 INJECTION, SOLUTION INTRAVENOUS; SUBCUTANEOUS at 22:13

## 2024-10-01 RX ADMIN — INSULIN LISPRO 7 UNITS: 100 INJECTION, SOLUTION INTRAVENOUS; SUBCUTANEOUS at 17:00

## 2024-10-01 RX ADMIN — IPRATROPIUM BROMIDE AND ALBUTEROL SULFATE 1 DOSE: 2.5; .5 SOLUTION RESPIRATORY (INHALATION) at 08:02

## 2024-10-01 RX ADMIN — ROSUVASTATIN 5 MG: 10 TABLET, FILM COATED ORAL at 22:13

## 2024-10-01 RX ADMIN — INSULIN LISPRO 7 UNITS: 100 INJECTION, SOLUTION INTRAVENOUS; SUBCUTANEOUS at 08:21

## 2024-10-01 NOTE — CARE COORDINATION
Care coordination Following for discharge planning.  Case discussed in IDR and with attending.  Met with patient,  Spoke to Jovanny at [facility] [phone].  Plan to complete steroid wean and stabilize blood sugar prior to discharge.  Patient uses insulin at home. RN obtained previous records and reviewed home meds with MD.  Goal to establish on home dose.  Patient is uninsured and reports issues with affording meds.  He has some insulin and about a week supply of his inhalers at home.   Will follow for needs and can consider voucher assist with meds to beds at discharge.   Per Jovanny at [facility] , they will provide transportation to the residential sober [facility] in [place] at discharge.  Patient has bed there and will be able to return to his job.

## 2024-10-02 VITALS
WEIGHT: 200 LBS | HEIGHT: 72 IN | TEMPERATURE: 97.2 F | OXYGEN SATURATION: 96 % | SYSTOLIC BLOOD PRESSURE: 116 MMHG | HEART RATE: 103 BPM | RESPIRATION RATE: 20 BRPM | BODY MASS INDEX: 27.09 KG/M2 | DIASTOLIC BLOOD PRESSURE: 87 MMHG

## 2024-10-02 LAB
ANION GAP SERPL CALCULATED.3IONS-SCNC: 16 MMOL/L (ref 7–16)
ATYPICAL LYMPHOCYTE ABSOLUTE COUNT: 0.7 K/UL (ref 0–0.46)
ATYPICAL LYMPHOCYTES: 5 % (ref 0–4)
BASOPHILS # BLD: 0 K/UL (ref 0–0.2)
BASOPHILS NFR BLD: 0 % (ref 0–2)
BUN SERPL-MCNC: 47 MG/DL (ref 6–20)
CALCIUM SERPL-MCNC: 9.8 MG/DL (ref 8.6–10.2)
CHLORIDE SERPL-SCNC: 99 MMOL/L (ref 98–107)
CO2 SERPL-SCNC: 24 MMOL/L (ref 22–29)
CREAT SERPL-MCNC: 1.3 MG/DL (ref 0.7–1.2)
EOSINOPHIL # BLD: 1.28 K/UL (ref 0.05–0.5)
EOSINOPHILS RELATIVE PERCENT: 10 % (ref 0–6)
ERYTHROCYTE [DISTWIDTH] IN BLOOD BY AUTOMATED COUNT: 12.5 % (ref 11.5–15)
GFR, ESTIMATED: 70 ML/MIN/1.73M2
GLUCOSE BLD-MCNC: 125 MG/DL (ref 74–99)
GLUCOSE BLD-MCNC: 90 MG/DL (ref 74–99)
GLUCOSE SERPL-MCNC: 138 MG/DL (ref 74–99)
HCT VFR BLD AUTO: 49.5 % (ref 37–54)
HGB BLD-MCNC: 17.1 G/DL (ref 12.5–16.5)
LYMPHOCYTES NFR BLD: 4.07 K/UL (ref 1.5–4)
LYMPHOCYTES RELATIVE PERCENT: 30 % (ref 20–42)
MAGNESIUM SERPL-MCNC: 2.2 MG/DL (ref 1.6–2.6)
MCH RBC QN AUTO: 28.5 PG (ref 26–35)
MCHC RBC AUTO-ENTMCNC: 34.5 G/DL (ref 32–34.5)
MCV RBC AUTO: 82.5 FL (ref 80–99.9)
MONOCYTES NFR BLD: 0.93 K/UL (ref 0.1–0.95)
MONOCYTES NFR BLD: 7 % (ref 2–12)
MYELOCYTES ABSOLUTE COUNT: 0.12 K/UL
MYELOCYTES: 1 %
NEUTROPHILS NFR BLD: 47 % (ref 43–80)
NEUTS SEG NFR BLD: 6.4 K/UL (ref 1.8–7.3)
PHOSPHATE SERPL-MCNC: 5 MG/DL (ref 2.5–4.5)
PLATELET # BLD AUTO: 408 K/UL (ref 130–450)
PMV BLD AUTO: 9.1 FL (ref 7–12)
POTASSIUM SERPL-SCNC: 3.8 MMOL/L (ref 3.5–5)
RBC # BLD AUTO: 6 M/UL (ref 3.8–5.8)
RBC # BLD: NORMAL 10*6/UL
SODIUM SERPL-SCNC: 139 MMOL/L (ref 132–146)
WBC OTHER # BLD: 13.5 K/UL (ref 4.5–11.5)

## 2024-10-02 PROCEDURE — 99232 SBSQ HOSP IP/OBS MODERATE 35: CPT | Performed by: INTERNAL MEDICINE

## 2024-10-02 PROCEDURE — 82962 GLUCOSE BLOOD TEST: CPT

## 2024-10-02 PROCEDURE — 80048 BASIC METABOLIC PNL TOTAL CA: CPT

## 2024-10-02 PROCEDURE — 94640 AIRWAY INHALATION TREATMENT: CPT

## 2024-10-02 PROCEDURE — 6370000000 HC RX 637 (ALT 250 FOR IP): Performed by: INTERNAL MEDICINE

## 2024-10-02 PROCEDURE — 84100 ASSAY OF PHOSPHORUS: CPT

## 2024-10-02 PROCEDURE — 6370000000 HC RX 637 (ALT 250 FOR IP): Performed by: FAMILY MEDICINE

## 2024-10-02 PROCEDURE — 85025 COMPLETE CBC W/AUTO DIFF WBC: CPT

## 2024-10-02 PROCEDURE — 36415 COLL VENOUS BLD VENIPUNCTURE: CPT

## 2024-10-02 PROCEDURE — 99239 HOSP IP/OBS DSCHRG MGMT >30: CPT | Performed by: FAMILY MEDICINE

## 2024-10-02 PROCEDURE — 6360000002 HC RX W HCPCS: Performed by: INTERNAL MEDICINE

## 2024-10-02 PROCEDURE — 83735 ASSAY OF MAGNESIUM: CPT

## 2024-10-02 RX ORDER — INSULIN LISPRO 100 [IU]/ML
7 INJECTION, SOLUTION INTRAVENOUS; SUBCUTANEOUS
Qty: 3 EACH | Refills: 1 | Status: SHIPPED | OUTPATIENT
Start: 2024-10-02

## 2024-10-02 RX ORDER — INSULIN GLARGINE 100 [IU]/ML
18 INJECTION, SOLUTION SUBCUTANEOUS NIGHTLY
Status: DISCONTINUED | OUTPATIENT
Start: 2024-10-02 | End: 2024-10-02 | Stop reason: HOSPADM

## 2024-10-02 RX ORDER — BUPROPION HYDROCHLORIDE 150 MG/1
150 TABLET, EXTENDED RELEASE ORAL 2 TIMES DAILY
Qty: 60 TABLET | Refills: 0 | Status: SHIPPED | OUTPATIENT
Start: 2024-10-02

## 2024-10-02 RX ORDER — INSULIN GLARGINE 100 [IU]/ML
24 INJECTION, SOLUTION SUBCUTANEOUS NIGHTLY
Qty: 10 ML | Refills: 1 | Status: SHIPPED | OUTPATIENT
Start: 2024-10-02

## 2024-10-02 RX ORDER — INSULIN LISPRO 100 [IU]/ML
5 INJECTION, SOLUTION INTRAVENOUS; SUBCUTANEOUS
Status: DISCONTINUED | OUTPATIENT
Start: 2024-10-02 | End: 2024-10-02 | Stop reason: HOSPADM

## 2024-10-02 RX ORDER — INSULIN LISPRO 100 [IU]/ML
0-12 INJECTION, SOLUTION INTRAVENOUS; SUBCUTANEOUS
Qty: 3 EACH | Refills: 1 | Status: SHIPPED | OUTPATIENT
Start: 2024-10-02 | End: 2024-11-01

## 2024-10-02 RX ORDER — BENZONATATE 100 MG/1
100 CAPSULE ORAL 3 TIMES DAILY PRN
Qty: 21 CAPSULE | Refills: 0 | Status: SHIPPED | OUTPATIENT
Start: 2024-10-02 | End: 2024-10-09

## 2024-10-02 RX ORDER — HYDROXYZINE PAMOATE 25 MG/1
25 CAPSULE ORAL 3 TIMES DAILY PRN
Qty: 30 CAPSULE | Refills: 0 | Status: SHIPPED | OUTPATIENT
Start: 2024-10-02 | End: 2024-10-12

## 2024-10-02 RX ORDER — PREDNISONE 20 MG/1
20 TABLET ORAL 2 TIMES DAILY
Qty: 6 TABLET | Refills: 0 | Status: SHIPPED | OUTPATIENT
Start: 2024-10-02 | End: 2024-10-05

## 2024-10-02 RX ORDER — ALBUTEROL SULFATE 90 UG/1
2 INHALANT RESPIRATORY (INHALATION) EVERY 6 HOURS PRN
Qty: 18 G | Refills: 1 | Status: SHIPPED | OUTPATIENT
Start: 2024-10-02

## 2024-10-02 RX ORDER — BUDESONIDE AND FORMOTEROL FUMARATE DIHYDRATE 160; 4.5 UG/1; UG/1
2 AEROSOL RESPIRATORY (INHALATION) 2 TIMES DAILY
Qty: 10.2 G | Refills: 1 | Status: SHIPPED | OUTPATIENT
Start: 2024-10-02

## 2024-10-02 RX ADMIN — METFORMIN HYDROCHLORIDE 500 MG: 500 TABLET ORAL at 08:33

## 2024-10-02 RX ADMIN — IPRATROPIUM BROMIDE AND ALBUTEROL SULFATE 1 DOSE: 2.5; .5 SOLUTION RESPIRATORY (INHALATION) at 12:23

## 2024-10-02 RX ADMIN — IPRATROPIUM BROMIDE AND ALBUTEROL SULFATE 1 DOSE: 2.5; .5 SOLUTION RESPIRATORY (INHALATION) at 08:11

## 2024-10-02 RX ADMIN — INSULIN LISPRO 7 UNITS: 100 INJECTION, SOLUTION INTRAVENOUS; SUBCUTANEOUS at 11:29

## 2024-10-02 RX ADMIN — INSULIN LISPRO 7 UNITS: 100 INJECTION, SOLUTION INTRAVENOUS; SUBCUTANEOUS at 08:32

## 2024-10-02 RX ADMIN — PANTOPRAZOLE SODIUM 40 MG: 40 TABLET, DELAYED RELEASE ORAL at 08:32

## 2024-10-02 RX ADMIN — PREDNISONE 20 MG: 20 TABLET ORAL at 08:32

## 2024-10-02 RX ADMIN — BUPROPION HYDROCHLORIDE 150 MG: 150 TABLET, FILM COATED, EXTENDED RELEASE ORAL at 08:31

## 2024-10-02 RX ADMIN — BUDESONIDE 500 MCG: 0.5 SUSPENSION RESPIRATORY (INHALATION) at 08:11

## 2024-10-02 RX ADMIN — ENOXAPARIN SODIUM 40 MG: 100 INJECTION SUBCUTANEOUS at 08:32

## 2024-10-02 RX ADMIN — LOSARTAN POTASSIUM 25 MG: 25 TABLET, FILM COATED ORAL at 08:32

## 2024-10-02 ASSESSMENT — PAIN SCALES - GENERAL: PAINLEVEL_OUTOF10: 0

## 2024-10-02 ASSESSMENT — PAIN SCALES - WONG BAKER: WONGBAKER_NUMERICALRESPONSE: NO HURT

## 2024-10-02 NOTE — DISCHARGE INSTR - COC
whole    Wound Care Documentation and Therapy:        Elimination:  Continence:   Bowel: Yes  Bladder: Yes  Urinary Catheter: None   Colostomy/Ileostomy/Ileal Conduit: No       Date of Last BM: 10/1/2024    Intake/Output Summary (Last 24 hours) at 10/2/2024 1047  Last data filed at 10/2/2024 1042  Gross per 24 hour   Intake 660 ml   Output 500 ml   Net 160 ml     I/O last 3 completed shifts:  In: 430 [P.O.:420; I.V.:10]  Out: 500 [Urine:500]    Safety Concerns:     None    Impairments/Disabilities:      None    Nutrition Therapy:  Current Nutrition Therapy:   - Oral Diet:  General    Routes of Feeding: Oral  Liquids: Thin Liquids  Daily Fluid Restriction: no  Last Modified Barium Swallow with Video (Video Swallowing Test): not done    Treatments at the Time of Hospital Discharge:   Respiratory Treatments: yes  Oxygen Therapy:  is not on home oxygen therapy.  Ventilator:    - No ventilator support    Rehab Therapies: {THERAPEUTIC INTERVENTION:6529857585}  Weight Bearing Status/Restrictions: No weight bearing restrictions  Other Medical Equipment (for information only, NOT a DME order):  {EQUIPMENT:823287337}  Other Treatments: ***    Patient's personal belongings (please select all that are sent with patient):  {[facility] DME Belongings:661916769}    RN SIGNATURE:  Electronically signed by Angeles Valerio RN on 10/2/24 at 10:49 AM EDT    CASE MANAGEMENT/SOCIAL WORK SECTION    Inpatient Status Date: ***    Readmission Risk Assessment Score:  Readmission Risk              Risk of Unplanned Readmission:  23           Discharging to Facility/ Agency   Name:   Address:  Phone:  Fax:    Dialysis Facility (if applicable)   Name:  Address:  Dialysis Schedule:  Phone:  Fax:    / signature: {Esignature:[id number]}    PHYSICIAN SECTION    Prognosis: {Prognosis:4258792649}    Condition at Discharge: { Patient Condition:776751174}    Rehab Potential (if transferring to Rehab):

## 2024-10-02 NOTE — PROGRESS NOTES
Critical Care Team - Daily Progress Note         Date and time: 9/27/2024 5:41 PM  Patient's name:  Sultan Abarca  Medical Record Number: [id number]  Patient's account/billing number: [id number]  Patient's YOB: 1975  Age: 49 y.o.  Date of Admission: 9/25/2024 12:31 PM  Length of stay during current admission: 2      Primary Care Physician: Mayda Pcp  ICU Attending Physician: Dr. Thompson Status: Full Code    Reason for ICU admission: Status Asthmaticus    SUBJECTIVE:     Patient is a 49-year-old male with a past medical history of asthma but no documented PFTs, polysubstance abuse, HFpEF and others as noted in assessment and plan below.  The patient presented from [place] [id number] to the emergency department due to concerns of worsening shortness of breath.  Expiratory wheezing was noted and there were concerns of a possible asthma exacerbation.     He presented to the emergency department tachycardic and hypertensive.  Oxygen  supplementation was increased and the patient was placed on noninvasive ventilation.  He was treated with DuoNebs, magnesium, Solu-Medrol and fluid bolus.  Multiple rounds of albuterol.  Was also given terbutaline and IV ketamine.  Ultimately given concerns of impending respiratory doom, the patient had to be intubated for airway protection.     Labs were significant for hyperkalemia, hyperglycemia. ABG showing Respiratory acidosis with concomitant metabolic acidosis. CXR showing mild patchy infiltrates. Covid negative. Was started on empiric antibiotics .      Patient was transferred to the medical ICU for further management and care.      CURRENT VENTILATION STATUS:     [x] Ventilator  [] BIPAP  [] Nasal Cannula [] Room Air      IF INTUBATED, ET TUBE MARKING AT LOWER LIP:       cms    SECRETIONS Amount:  [] Small [] Moderate  [] Large  [x] None  Color:     [] White [] Colored  [] Bloody    SEDATION:  RAAS Score:  [] Propofol gtt  [x] Versed gtt  [] Ativan gtt   [] No 
       [facility] Hospitalist Progress Note    Admitting Date and Time: 9/25/2024 12:31 PM  Admit Dx: Acute respiratory failure [J96.00]  Acute respiratory failure, unspecified whether with hypoxia or hypercapnia [J96.00]  Asthma with acute exacerbation, unspecified asthma severity, unspecified whether persistent [J45.901]    Subjective:  Patient is being followed for Acute respiratory failure [J96.00]  Acute respiratory failure, unspecified whether with hypoxia or hypercapnia [J96.00]  Asthma with acute exacerbation, unspecified asthma severity, unspecified whether persistent [J45.901]   Pt  extubated now on nasal  cannula having breathing treatment.c/o chest pain which is reproducible.    ROS:intubated     methylPREDNISolone  60 mg IntraVENous Q8H    pantoprazole  40 mg Oral QAM AC    insulin lispro  0-8 Units SubCUTAneous TID WC    insulin lispro  0-4 Units SubCUTAneous Nightly    sodium chloride flush  5-40 mL IntraVENous 2 times per day    enoxaparin  40 mg SubCUTAneous Daily    budesonide  0.5 mg Nebulization BID RT    montelukast  10 mg Oral Nightly    losartan  25 mg Oral Daily    rosuvastatin  5 mg Oral Nightly    ipratropium 0.5 mg-albuterol 2.5 mg  1 Dose Inhalation Q4H While awake     ibuprofen, 400 mg, Q6H PRN  acetaminophen, 650 mg, Q6H PRN   Or  acetaminophen, 650 mg, Q6H PRN  glucose, 4 tablet, PRN  dextrose bolus, 125 mL, PRN   Or  dextrose bolus, 250 mL, PRN  glucagon (rDNA), 1 mg, PRN  dextrose, , Continuous PRN  benzocaine-menthol, 1 lozenge, Q2H PRN  benzonatate, 100 mg, TID PRN  calcium carbonate, 500 mg, TID PRN  hydrALAZINE, 10 mg, Q6H PRN  Polyvinyl Alcohol-Povidone PF, 1 drop, PRN  sodium chloride, 1 spray, PRN  sodium chloride flush, 5-40 mL, PRN  sodium chloride, , PRN  ondansetron, 4 mg, Q8H PRN   Or  ondansetron, 4 mg, Q6H PRN  polyethylene glycol, 17 g, Daily PRN  glucose, 4 tablet, PRN  dextrose bolus, 125 mL, PRN   Or  dextrose bolus, 250 mL, PRN  dextrose, , Continuous PRN     
       [facility] Hospitalist Progress Note    Admitting Date and Time: 9/25/2024 12:31 PM  Admit Dx: Acute respiratory failure [J96.00]  Acute respiratory failure, unspecified whether with hypoxia or hypercapnia [J96.00]  Asthma with acute exacerbation, unspecified asthma severity, unspecified whether persistent [J45.901]    Subjective:  Patient is being followed for Acute respiratory failure [J96.00]  Acute respiratory failure, unspecified whether with hypoxia or hypercapnia [J96.00]  Asthma with acute exacerbation, unspecified asthma severity, unspecified whether persistent [J45.901]   Pt intubated    ROS:intubated     insulin lispro  0-8 Units SubCUTAneous Q6H    pantoprazole (PROTONIX) 40 mg in sodium chloride (PF) 0.9 % 10 mL injection  40 mg IntraVENous Daily    methylPREDNISolone  60 mg IntraVENous Q6H    sodium chloride flush  5-40 mL IntraVENous 2 times per day    enoxaparin  40 mg SubCUTAneous Daily    budesonide  0.5 mg Nebulization BID RT    [Held by provider] insulin glargine  12 Units SubCUTAneous Nightly    montelukast  10 mg Oral Nightly    [Held by provider] losartan  25 mg Oral Daily    rosuvastatin  5 mg Oral Nightly    ipratropium 0.5 mg-albuterol 2.5 mg  1 Dose Inhalation Q4H While awake    cefepime  2,000 mg IntraVENous Q8H     glucose, 4 tablet, PRN  dextrose bolus, 125 mL, PRN   Or  dextrose bolus, 250 mL, PRN  glucagon (rDNA), 1 mg, PRN  dextrose, , Continuous PRN  benzocaine-menthol, 1 lozenge, Q2H PRN  benzonatate, 100 mg, TID PRN  calcium carbonate, 500 mg, TID PRN  hydrALAZINE, 10 mg, Q6H PRN  Polyvinyl Alcohol-Povidone PF, 1 drop, PRN  sodium chloride, 1 spray, PRN  sodium chloride flush, 5-40 mL, PRN  sodium chloride, , PRN  ondansetron, 4 mg, Q8H PRN   Or  ondansetron, 4 mg, Q6H PRN  polyethylene glycol, 17 g, Daily PRN  acetaminophen, 650 mg, Q6H PRN   Or  acetaminophen, 650 mg, Q6H PRN  glucose, 4 tablet, PRN  dextrose bolus, 125 mL, PRN   Or  dextrose bolus, 250 mL, 
       [facility] Hospitalist Progress Note    Admitting Date and Time: 9/25/2024 12:31 PM  Admit Dx: Acute respiratory failure [J96.00]  Acute respiratory failure, unspecified whether with hypoxia or hypercapnia [J96.00]  Asthma with acute exacerbation, unspecified asthma severity, unspecified whether persistent [J45.901]  Synopsis:this is a 49-year-old male with past medical history of drug abuse and diabetes mellitus came from drug rehab due to shortness of breath.  For shortness of breath progressively worsening that led to intubation and admitted under ICU.  Patient extubated and now on room air.  Subjective:  Patient is being followed for Acute respiratory failure [J96.00]  Acute respiratory failure, unspecified whether with hypoxia or hypercapnia [J96.00]  Asthma with acute exacerbation, unspecified asthma severity, unspecified whether persistent [J45.901]   Pt  on room air now.    ROS:intubated     insulin lispro  0-12 Units SubCUTAneous TID WC    methylPREDNISolone  60 mg IntraVENous Q8H    pantoprazole  40 mg Oral QAM AC    insulin lispro  0-4 Units SubCUTAneous Nightly    melatonin  5 mg Oral Nightly    sodium chloride flush  5-40 mL IntraVENous 2 times per day    enoxaparin  40 mg SubCUTAneous Daily    budesonide  0.5 mg Nebulization BID RT    montelukast  10 mg Oral Nightly    losartan  25 mg Oral Daily    rosuvastatin  5 mg Oral Nightly    ipratropium 0.5 mg-albuterol 2.5 mg  1 Dose Inhalation Q4H While awake     ibuprofen, 400 mg, Q6H PRN  acetaminophen, 650 mg, Q6H PRN   Or  acetaminophen, 650 mg, Q6H PRN  ipratropium 0.5 mg-albuterol 2.5 mg, 1 Dose, Q4H PRN  hydrOXYzine, 25 mg, Q6H PRN  glucose, 4 tablet, PRN  dextrose bolus, 125 mL, PRN   Or  dextrose bolus, 250 mL, PRN  glucagon (rDNA), 1 mg, PRN  dextrose, , Continuous PRN  benzocaine-menthol, 1 lozenge, Q2H PRN  benzonatate, 100 mg, TID PRN  calcium carbonate, 500 mg, TID PRN  hydrALAZINE, 10 mg, Q6H PRN  Polyvinyl Alcohol-Povidone PF, 1 drop, 
   09/25/24 1349   NIV Type   NIV Started/Stopped On   Equipment Type v60   Mode Bilevel   Mask Type Full face mask   Mask Size Medium   Assessment   Pulse (!) 144   Respirations (!) 32   SpO2 100 %   Comfort Level Fair   Using Accessory Muscles Yes   Mask Compliance Good   Skin Assessment Clean, dry, & intact   Settings/Measurements   PIP Observed 13 cm H20   IPAP 12 cmH20   CPAP/EPAP 6 cmH2O   Vt (Measured) 740 mL   Rate Ordered 14   Insp Rise Time (%) 3 %   FiO2  60 %   I Time/ I Time % 0.85 s   Minute Volume (L/min) 23.8 Liters   Mask Leak (lpm) 28 lpm   Patient's Home Machine No   Alarm Settings   Alarms On Y       
   09/26/24 0940   Patient Observation   Pulse (!) 101   Respirations 22   SpO2 100 %   Vent Information   Vent Mode AC/PRVC   Ventilator Settings   FiO2  40 %   Insp Time (sec) 0.6 sec   Vt (Set, mL) 500 mL   Resp Rate (Set) 22 bpm   PEEP/CPAP (cmH2O) 8   Vent Patient Data (Readings)   Vt (Measured) 520 mL   Peak Inspiratory Pressure (cmH2O) 41 cmH2O   Rate Measured 22 br/min   Minute Volume (L/min) 11.4 Liters   Mean Airway Pressure (cmH2O) 16 cmH20   Plateau Pressure (cm H2O) 19 cm H2O   Driving Pressure 11   I:E Ratio 1:3.60   Static Compliance (L/cm H2O) 48   I Time/ I Time % 0 s   Vent Alarm Settings   High Pressure (cmH2O) 50 cmH2O     DAILY VENTILATOR WEANING ASSESSMENT PERFORMED    P/FIO2 Ratio =334          (<100= do not Wean)                  Cs =   48                       (<32= Instability)  Plat. Pressure = 20  MV =11.0  RSBI =    Instabilities:       Cardiovascular =       CNS =2       Respiratory =       Metabolic =    Parameters    no    Wean per protocol  no    Ask Physician for a weaning plan no    Additional Comments:     Performed by Simona Boucher RCP RRT      Reference Table:    Cardiovascular     CNS      1. Mean BP less than or equal to 75   1. Neuromuscular blockade  2. Heart Rate greater than 130   2. RASS of -3, -4, -5  3. Myocardial Ischemia    3. RASS of +3, +4  4. Mechanical Assist Device    4. ICP greater than 15 or             Intracranial Hypertension         Respiratory      Metabolic  1. PEEP equal to or greater than 10cm/H20  1.Temp. (8hrs) less than 95 or > 103  2. Respiratory Rate greater than 35   2. WBC < 5000 or > 65699  3. Minute Volume greater than 15L  4. pH less than 7.30  5. Deteriorating chest X-ray    Will wait for Dr. Grant.     
  [facility]  Department of Internal Medicine  Division of Pulmonary, Critical Care and Sleep Medicine  Progress Note      Diego Daly, APRN-CNP  Mary Lelia, APRN-CNP          Subjective:  We are following the patient out of the ICU.  Patient seen and examined.  He was seen on room air.  He reports significant improvement in his breathing.  He is still wheezing he feels the breathing treatments are helpful.      OBJECTIVE:     PHYSICAL EXAM:   VITALS:   Vitals:    09/30/24 2000 09/30/24 2328 10/01/24 0400 10/01/24 0802   BP: (!) 127/102 135/88 137/75    Pulse: 97 99 78    Resp: 22 18 18    Temp: 97.5 °F (36.4 °C) 97.8 °F (36.6 °C) 97 °F (36.1 °C)    TempSrc: Temporal Temporal     SpO2:  95% 95% 95%   Weight:       Height:            Intake/Output Summary (Last 24 hours) at 10/1/2024 0843  Last data filed at 10/1/2024 0834  Gross per 24 hour   Intake 10 ml   Output --   Net 10 ml        CONSTITUTIONAL:   A&O x 3, NAD  SKIN:     No rash, No suspicious lesions, No skin discoloration  HEENT:     EOMI, MMM, No thrush  NECK:    No bruits, No JVP appreciated  CV:      Sinus,  No murmur, No rubs, No gallops  PULMONARY:   Expiratory wheezing, No Rales, No Rhonchi      No noted egophony  ABDOMEN:     Soft, non-tender. BS normal. No R/R/G  EXT:    No deformities .  No clubbing.       no lower extremity edema, No venous stasis  PULSE:   Appears equal and palpable.  PSYCHIATRIC:  Seems appropriate, No acute psychosis  MS:    No fractures, No gross weakness  NEUROLOGIC:   The clinical assessment is non-focal     DATA: IMAGING & TESTING:     LABORATORY TESTS:    CBC with Differential:    Lab Results   Component Value Date/Time    WBC 9.4 10/01/2024 05:41 AM    RBC 5.95 10/01/2024 05:41 AM    HGB 16.9 10/01/2024 05:41 AM    HCT 49.3 10/01/2024 05:41 
4 Eyes Skin Assessment     NAME:  Sultan Abarca  YOB: 1975  MEDICAL RECORD NUMBER:  [id number]    The patient is being assessed for  Admission    I agree that at least one RN has performed a thorough Head to Toe Skin Assessment on the patient. ALL assessment sites listed below have been assessed.      Areas assessed by both nurses:    Head, Face, Ears, Shoulders, Back, Chest, Arms, Elbows, Hands, Sacrum. Buttock, Coccyx, Ischium, Legs. Feet and Heels, and Under Medical Devices         Does the Patient have a Wound? No noted wound(s)       Guy Prevention initiated by RN: Yes  Wound Care Orders initiated by RN: No    Pressure Injury (Stage 3,4, Unstageable, DTI, NWPT, and Complex wounds) if present, place Wound referral order by RN under : No    New Ostomies, if present place, Ostomy referral order under : No     Nurse 1 eSignature: Electronically signed by Jace Mayers RN on 9/26/24 at 1:24 AM EDT    **SHARE this note so that the co-signing nurse can place an eSignature**    Nurse 2 eSignature: Electronically signed by Selma Lozada RN on 9/26/24 at 1:25 AM EDT  
4 Eyes Skin Assessment     NAME:  Sultan Abarca  YOB: 1975  MEDICAL RECORD NUMBER:  [id number]    The patient is being assessed for  Transfer to New Unit    I agree that at least one RN has performed a thorough Head to Toe Skin Assessment on the patient. ALL assessment sites listed below have been assessed.      Areas assessed by both nurses:    Head, Face, Ears, Shoulders, Back, Chest, Arms, Elbows, Hands, Sacrum. Buttock, Coccyx, Ischium, Legs. Feet and Heels, and Under Medical Devices         Does the Patient have a Wound? No noted wound(s)       Guy Prevention initiated by RN: Yes  Wound Care Orders initiated by RN: No    Pressure Injury (Stage 3,4, Unstageable, DTI, NWPT, and Complex wounds) if present, place Wound referral order by RN under : No    New Ostomies, if present place, Ostomy referral order under : No     Nurse 1 eSignature: Electronically signed by Michael Galvez RN on 10/1/24 at 5:10 AM EDT    **SHARE this note so that the co-signing nurse can place an eSignature**    Nurse 2 eSignature: Electronically signed by Zenaida Pena RN on 10/1/24 at 5:57 AM EDT   
After previously receiving 3 strait catheterizations, patient required a 4th strait cath after bladder scanning the patient for 898 cc urine in the bladder. Slainas placed using sterile procedure with no complications. Output after insertion was 980 cc of clear yellow urine.  
Antibiotic Extended Infusion Policy     This patient is on medication that requires renal, weight, and/or indication dose adjustment.      Date Body Weight IBW  Adjusted BW SCr  CrCl Dialysis status BMI   9/25/2024 90.7 kg (200 lb) Ideal body weight: 77.6 kg (171 lb 1.2 oz)  Adjusted ideal body weight: 82.8 kg (182 lb 10.3 oz) Serum creatinine: 1.1 mg/dL 09/25/24 1340  Estimated creatinine clearance: 91 mL/min N/a Body mass index is 27.12 kg/m².       Pharmacy has dose-adjusted the following medication(s):    Ordered Medication: Cefepime 2000mg q12h     Order Changed/converted to: Cefepime 2000mg q8h    These changes were made per protocol according to the [facility]   Automatic Extended Infusion Dose Adjustment Policy.     *Please note this dose may need readjusted if patient's condition changes.    Please contact pharmacy with any questions regarding these changes.    Long Sanchez RPH  9/25/2024  7:32 PM    
Attending Physician Attestation: Dr. Solitario Domingo    Thank you very much for allowing me to see this patient in consultation and follow up.    I personally saw, examined and provided care for the patient. Radiographs, labs and medication list were reviewed by me independently. I spoke with bedside nursing, respiratory therapists and consultants. Critical care services and times documented are independent of procedures and multidisciplinary rounds with Residents. Additionally comprehensive, multidisciplinary rounds were conducted with the MICU team. The case was discussed in detail and plans for care were established. Review of Residents documentation was conducted and revisions were made as appropriate. I agree with the the above documented information.     Current Facility-Administered Medications   Medication Dose Route Frequency Provider Last Rate Last Admin    insulin lispro (HUMALOG,ADMELOG) injection vial 0-12 Units  0-12 Units SubCUTAneous TID Juan Carlos Lane MD        methylPREDNISolone sodium succ (SOLU-MEDROL) 60 mg in sterile water 0.96 mL injection  60 mg IntraVENous Q8H Korina Saldana MD   60 mg at 09/29/24 0545    pantoprazole (PROTONIX) tablet 40 mg  40 mg Oral QAM AC Korina Saldana MD   40 mg at 09/29/24 0545    insulin lispro (HUMALOG,ADMELOG) injection vial 0-4 Units  0-4 Units SubCUTAneous Nightly Korina Saldana MD        ibuprofen (ADVIL;MOTRIN) tablet 400 mg  400 mg Oral Q6H PRN Lenora Bush MD   400 mg at 09/28/24 1316    acetaminophen (TYLENOL) tablet 650 mg  650 mg Oral Q6H PRN Lenora Bush MD   650 mg at 09/29/24 0812    Or    acetaminophen (TYLENOL) suppository 650 mg  650 mg Rectal Q6H PRN Lenora Bush MD        ipratropium 0.5 mg-albuterol 2.5 mg (DUONEB) nebulizer solution 1 Dose  1 Dose Inhalation Q4H PRN Korina Saldana MD   1 Dose at 09/28/24 2202    melatonin disintegrating tablet 5 mg  5 
Attending Physician Attestation: Dr. Solitario Domingo    Thank you very much for allowing me to see this patient in consultation and follow up.    I personally saw, examined and provided care for the patient. Radiographs, labs and medication list were reviewed by me independently. I spoke with bedside nursing, respiratory therapists and consultants. Critical care services and times documented are independent of procedures and multidisciplinary rounds with Residents. Additionally comprehensive, multidisciplinary rounds were conducted with the MICU team. The case was discussed in detail and plans for care were established. Review of Residents documentation was conducted and revisions were made as appropriate. I agree with the the above documented information.     Current Facility-Administered Medications   Medication Dose Route Frequency Provider Last Rate Last Admin    insulin lispro (HUMALOG,ADMELOG) injection vial 0-8 Units  0-8 Units SubCUTAneous Q6H Korina Saldana MD   2 Units at 09/27/24 0607    pantoprazole (PROTONIX) 40 mg in sodium chloride (PF) 0.9 % 10 mL injection  40 mg IntraVENous Daily Korina Saldana MD   40 mg at 09/27/24 0836    glucose chewable tablet 16 g  4 tablet Oral PRN Jovanny Reardon MD        dextrose bolus 10% 125 mL  125 mL IntraVENous PRN Jovanny Reardon MD        Or    dextrose bolus 10% 250 mL  250 mL IntraVENous PRN Jovanny Reardon MD        glucagon injection 1 mg  1 mg SubCUTAneous PRN Jovanny Reardon MD        dextrose 10 % infusion   IntraVENous Continuous PRN Jovanny Reardon MD        methylPREDNISolone sodium succ (SOLU-MEDROL) 60 mg in sterile water 0.96 mL injection  60 mg IntraVENous Q6H Toni Cates MD   60 mg at 09/27/24 0431    ketamine (KETALAR) 500 mg in sodium chloride 0.9 % 100 mL infusion  0.05-3 mg/kg/hr (Ideal) IntraVENous Continuous Solitario Domingo MD   Stopped at 09/27/24 0913    
Attending Physician Attestation: Dr. Solitario Domingo    Thank you very much for allowing me to see this patient in consultation and follow up.    I personally saw, examined and provided care for the patient. Radiographs, labs and medication list were reviewed by me independently. I spoke with bedside nursing, respiratory therapists and consultants. Critical care services and times documented are independent of procedures and multidisciplinary rounds with Residents. Additionally comprehensive, multidisciplinary rounds were conducted with the MICU team. The case was discussed in detail and plans for care were established. Review of Residents documentation was conducted and revisions were made as appropriate. I agree with the the above documented information.     Current Facility-Administered Medications   Medication Dose Route Frequency Provider Last Rate Last Admin    methylPREDNISolone sodium succ (SOLU-MEDROL) 60 mg in sterile water 0.96 mL injection  60 mg IntraVENous Q8H Korina Saldana MD        pantoprazole (PROTONIX) tablet 40 mg  40 mg Oral QAM AC Korina Saldana MD        ipratropium 0.5 mg-albuterol 2.5 mg (DUONEB) nebulizer solution 1 Dose  1 Dose Inhalation Once Korina Saldana MD        cefTRIAXone (ROCEPHIN) 2,000 mg in sterile water 20 mL IV syringe  2,000 mg IntraVENous Q24H Toni Cates MD   2,000 mg at 09/27/24 1434    insulin lispro (HUMALOG,ADMELOG) injection vial 0-12 Units  0-12 Units SubCUTAneous Q6H Juan Carlos Marquez MD   4 Units at 09/28/24 0617    glucose chewable tablet 16 g  4 tablet Oral PRN Jovanny Reardon MD        dextrose bolus 10% 125 mL  125 mL IntraVENous PRN Jovanny Reardon MD        Or    dextrose bolus 10% 250 mL  250 mL IntraVENous PRN Jovanny Reardon MD        glucagon injection 1 mg  1 mg SubCUTAneous PRN Jovanny Reardon MD        dextrose 10 % infusion   IntraVENous Continuous PRN 
Call placed to patient to notify that some belongings were left behind. Patient will attempt to retrieve items tonight.     Electronically signed by Sally Lopes RN on 10/2/2024 at 4:00 PM    
Date: 9/27/2024    Time: 8:18 PM    Patient Placed On BIPAP/CPAP/ Non-Invasive Ventilation?  No    If no must comment. On from previous shift  Facial area red/color change? No           If YES are Blister/Lesion present?No   If yes must notify nursing staff  BIPAP/CPAP skin barrier?  Yes    Skin barrier type:mepilexlite       Comments:        Kylie Devine RCP  
Date: 9/28/2024    Time: 3:10 AM    Patient Placed On BIPAP/CPAP/ Non-Invasive Ventilation?  Patient continues on bipap    If no must comment.  Facial area red/color change? No           If YES are Blister/Lesion present?No   If yes must notify nursing staff  BIPAP/CPAP skin barrier?  Yes    Skin barrier type:mepilexlite       Comments:        Lois Kwon RCP  
ENDOCRINOLOGY PROGRESS NOTE      Date of admission: 9/25/2024  Date of service: 9/29/2024  Admitting physician: Fariha Arriola DO   Primary Care Physician: Mayda Pcp  Consultant physician: Juan Carlos Marquez MD     Reason for the consultation:  Uncontrolled DM    History of Present Illness:  The history is provided by the patient. Accuracy of the patient data is excellent    Sultan Abarca is a very pleasant 49 y.o. old male with PMH of asthma, polysubstance abuse and other listed below admitted [facility] on 9/25/2024 because of high BP and tachycardia, endocrine service was consulted for diabetes management.    Subjective  The patient was seen and examined at bedside this morning, glucose level is variable but still slightly above goal.  No hypoglycemia      Inpatient diet:   Carb Restricted diet     Point of care glucose monitoring   (Independently reviewed)   Recent Labs     09/27/24  1446 09/27/24  1905 09/28/24  0102 09/28/24  0612 09/28/24  1242 09/28/24  1804 09/28/24 2005 09/29/24  0811   POCGLU 184* 176* 191* 206* 178* 201* 273* 193*   Scheduled Meds:   insulin lispro  0-12 Units SubCUTAneous TID WC    insulin glargine  9 Units SubCUTAneous Nightly    methylPREDNISolone  60 mg IntraVENous Q8H    pantoprazole  40 mg Oral QAM AC    insulin lispro  0-4 Units SubCUTAneous Nightly    melatonin  5 mg Oral Nightly    sodium chloride flush  5-40 mL IntraVENous 2 times per day    enoxaparin  40 mg SubCUTAneous Daily    budesonide  0.5 mg Nebulization BID RT    montelukast  10 mg Oral Nightly    losartan  25 mg Oral Daily    rosuvastatin  5 mg Oral Nightly    ipratropium 0.5 mg-albuterol 2.5 mg  1 Dose Inhalation Q4H While awake       PRN Meds:   ibuprofen, 400 mg, Q6H PRN  acetaminophen, 650 mg, Q6H PRN   Or  acetaminophen, 650 mg, Q6H PRN  ipratropium 0.5 mg-albuterol 2.5 mg, 1 Dose, Q4H PRN  hydrOXYzine, 25 mg, Q6H PRN  glucose, 4 tablet, PRN  dextrose bolus, 125 mL, PRN   Or  dextrose bolus, 250 mL, PRN  glucagon 
ENDOCRINOLOGY PROGRESS NOTE      Date of admission: 9/25/2024  Date of service: 9/30/2024  Admitting physician: Fariha Arriola DO   Primary Care Physician: Mayda Pcp  Consultant physician: Juan Carlos Marquez MD     Reason for the consultation:  Uncontrolled DM    History of Present Illness:  The history is provided by the patient. Accuracy of the patient data is excellent    Sultan Abarca is a very pleasant 49 y.o. old male with PMH of asthma, polysubstance abuse and other listed below admitted to [facility] on 9/25/2024 because of high BP and tachycardia, endocrine service was consulted for diabetes management.    Subjective  I saw and examined the patient at bedside this afternoon, no acute events overnight, patient feels better today.  Glucose level still above goal.  No abdominal      Inpatient diet:   Carb Restricted diet     Point of care glucose monitoring   (Independently reviewed)   Recent Labs     09/28/24 2005 09/29/24  0811 09/29/24  1225 09/29/24  1757 09/29/24 2000 09/30/24  0919 09/30/24  1215 09/30/24  1748   POCGLU 273* 193* 326* 203* 191* 251* 246* 256*   Scheduled Meds:   insulin glargine  15 Units SubCUTAneous Nightly    insulin lispro  7 Units SubCUTAneous TID WC    [START ON 10/1/2024] predniSONE  40 mg Oral Daily    insulin lispro  0-12 Units SubCUTAneous TID WC    pantoprazole  40 mg Oral QAM AC    insulin lispro  0-4 Units SubCUTAneous Nightly    melatonin  5 mg Oral Nightly    sodium chloride flush  5-40 mL IntraVENous 2 times per day    enoxaparin  40 mg SubCUTAneous Daily    budesonide  0.5 mg Nebulization BID RT    losartan  25 mg Oral Daily    rosuvastatin  5 mg Oral Nightly    ipratropium 0.5 mg-albuterol 2.5 mg  1 Dose Inhalation Q4H While awake       PRN Meds:   ibuprofen, 400 mg, Q6H PRN  acetaminophen, 650 mg, Q6H PRN   Or  acetaminophen, 650 mg, Q6H PRN  ipratropium 0.5 mg-albuterol 2.5 mg, 1 Dose, Q4H PRN  hydrOXYzine, 25 mg, Q6H PRN  glucose, 4 tablet, PRN  dextrose bolus, 125 mL, 
[facility]  Department of Internal Medicine   Internal Medicine Residency   MICU Progress Note    Patient:  Sultan Abarca 49 y.o. male  MRN: [id number]     Date of Service: 9/29/2024    Allergy: Patient has no known allergies.    Subjective     Patient was seen and evaluated at bedside today.  Reports mildly improved wheezing but states that it has been difficult for him to sleep patient received Vistaril for anxiety overnight.    24h change:   On RA  ROS: Denies Fever/chills/CP/SOB/N/V/D/C/Dysuria/Blood in stool or urine  Objective     VS: BP (!) 140/85   Pulse 85   Temp 97 °F (36.1 °C) (Temporal)   Resp 19   Ht 1.829 m (6')   Wt 90.7 kg (200 lb)   SpO2 94%   BMI 27.12 kg/m²           I & O - 24hr:   Intake/Output Summary (Last 24 hours) at 9/29/2024 1553  Last data filed at 9/29/2024 0847  Gross per 24 hour   Intake 930 ml   Output 3175 ml   Net -2245 ml       Physical Exam:  General Appearance: alert, appears stated age, and cooperative  Neck: no adenopathy, no carotid bruit, no JVD, supple, symmetrical, trachea midline, and thyroid not enlarged, symmetric, no tenderness/mass/nodules  Lung: wheezes bilaterally although improved compared to yesterday  Heart: regular rate and rhythm, S1, S2 normal, no murmur, click, rub or gallop  Abdomen: soft, non-tender; bowel sounds normal; no masses,  no organomegaly  Extremities:  extremities normal, atraumatic, no cyanosis or edema  Musculoskeletal: No joint swelling, no muscle tenderness. ROM normal in all joints of extremities.   Neurologic: Mental status: Alert, oriented, thought content appropriate    Lines     site day    Art line   None    TLC None    PICC None    Hemoaccess None    Oxygen:    97% on RA  ABG:     Lab Results   Component Value Date/Time    PH 7.405 09/28/2024 04:47 AM    PCO2 38.8 09/28/2024 04:47 AM    PO2 148.3 09/28/2024 04:47 AM    HCO3 23.8 09/28/2024 04:47 AM    BE -0.7 09/28/2024 04:47 AM    THB 13.7 09/28/2024 04:47 
Patient admitted to MICU with the following belongings:  Other slippers/slides . The following belongings admitted with the patient, None, were sent home with the patient's family.   
Patient continues to require restraints at this time. When un-restrained, patient tries to pull at lines and tubes.  
Patient requesting that I call his brother. Attempted to call the number in the chart, no answer and no option to leave voicemail.   
Patient was extubated to 4 liters/min via nasal cannula. Breath Sounds post extubation were clear. Stridor was not present post extubation. SPO2 was 98%.      Performed by  Shayy Vences RCP  
Pharmacy Consultation Note  (Antibiotic Dosing and Monitoring)    Initial consult date: 9/25/2024  Consulting physician/provider: Fariha Arriola DO.   Drug: Vancomycin  Indication: PNA    Age/  Gender Height Weight IBW  Allergy Information   49 y.o./male 182.9 cm (6') 90.7 kg (200 lb)     Ideal body weight: 77.6 kg (171 lb 1.2 oz)  Adjusted ideal body weight: 82.8 kg (182 lb 10.3 oz)   Patient has no known allergies.      Renal Function:  Recent Labs     09/25/24  1340   BUN 20   CREATININE 1.1       Intake/Output Summary (Last 24 hours) at 9/25/2024 2043  Last data filed at 9/25/2024 1440  Gross per 24 hour   Intake 50 ml   Output --   Net 50 ml     Assessment:  Patient is a 49 y.o. male who has been initiated on vancomycin for PNA.   Estimated Creatinine Clearance: 91 mL/min (based on SCr of 1.1 mg/dL).  To dose vancomycin, pharmacy will be utilizing [employer] calculation software for goal AUC/RUBEN 400-600 mg/L-hr (predicted AUC/RUBEN = 555, Tr =15.9 mcg/mL)  Vanco 2250mg x1 ordered in the ED (9/25) at 20:45.     Plan:  Will continue vancomycin 1250 mg IV every 12 hours to resume tomorrow AM (9/26).   Will check vancomycin trough when indicated.   Will continue to monitor renal function   Pharmacy to follow      Hoda Bright, PharmD, BCIDP, BCPS 9/25/2024 8:45 PM  [phone]  
Pharmacy Consultation Note  (Antibiotic Dosing and Monitoring)    Initial consult date: 9/25/2024  Consulting physician/provider: Fariha Arriola DO.   Drug: Vancomycin  Indication: PNA    Age/  Gender Height Weight IBW  Allergy Information   49 y.o./male 182.9 cm (6') 90.7 kg (200 lb)     Ideal body weight: 77.6 kg (171 lb 1.2 oz)  Adjusted ideal body weight: 82.8 kg (182 lb 10.3 oz)   Patient has no known allergies.      Renal Function:  Recent Labs     09/25/24  1340 09/26/24  0054 09/26/24  0357   BUN 20 23* 22*   CREATININE 1.1 1.2 1.2       Intake/Output Summary (Last 24 hours) at 9/26/2024 1024  Last data filed at 9/26/2024 0653  Gross per 24 hour   Intake 1915.35 ml   Output 987 ml   Net 928.35 ml     Assessment:  Patient is a 49 y.o. male who has been initiated on vancomycin for PNA.   Estimated Creatinine Clearance: 84 mL/min (based on SCr of 1.2 mg/dL).    Plan:  Will continue vancomycin 1250 mg IV every 12 hours     Tabby Otto, PharmD, BCPS, BCCCP 9/26/2024 10:25 AM   Ext [phone]  
Pharmacy Consultation Note  (Antibiotic Dosing and Monitoring)    Initial consult date: 9/25/2024  Consulting physician/provider: Fariha Arriola DO.   Drug: Vancomycin  Indication: PNA    Vancomycin has been discontinued. Clinical pharmacy will sign off, please reconsult if further assistance is needed.     Tabby Otto, PharmD, BCPS, BCCCP 9/27/2024 1:17 PM   Ext [phone]  
Placed pt on bipap due to pt feeling SOB.   09/27/24 1517   NIV Type   $NIV $Daily Charge   NIV Started/Stopped On   Equipment Type v60   Mode Bilevel   Mask Type Full face mask   Mask Size Medium   Assessment   Pulse (!) 102   Respirations 23   SpO2 100 %   Comfort Level Good   Using Accessory Muscles No   Mask Compliance Good   Skin Assessment Clean, dry, & intact   Settings/Measurements   PIP Observed 11 cm H20   IPAP 10 cmH20   CPAP/EPAP 5 cmH2O   Vt (Measured) 713 mL   Rate Ordered 14   Insp Rise Time (%) 2 %   FiO2  40 %   I Time/ I Time % 0.9 s   Minute Volume (L/min) 16.1 Liters   Mask Leak (lpm) 42 lpm   Patient's Home Machine No   Alarm Settings   Alarms On Y       
Psych consult placed through [facility].   
Spoke with respiratory for breathing treatment  
Spontaneous Parameters performed on PS 5    VT = 530 ml  f = 18  B/M  Ve = 9.70 L/M  NIF = -30  cmH2O  VC = 814 ml  RSBI = 33    Pt has audible cuff leak.     Performed by Shayy Vences RCP  
PRN  hydrALAZINE, 10 mg, Q6H PRN  Polyvinyl Alcohol-Povidone PF, 1 drop, PRN  sodium chloride, 1 spray, PRN  sodium chloride flush, 5-40 mL, PRN  sodium chloride, , PRN  ondansetron, 4 mg, Q8H PRN   Or  ondansetron, 4 mg, Q6H PRN  polyethylene glycol, 17 g, Daily PRN  glucose, 4 tablet, PRN  dextrose bolus, 125 mL, PRN   Or  dextrose bolus, 250 mL, PRN  dextrose, , Continuous PRN         Objective:    /78   Pulse 88   Temp 97.6 °F (36.4 °C)   Resp 17   Ht 1.829 m (6')   Wt 90.7 kg (200 lb)   SpO2 95%   BMI 27.12 kg/m²     General Appearance:intubated  Skin: warm and dry  Head: normocephalic and atraumatic  Eyes: pupils equal, round, and reactive to light, conjunctivae normal  Neck: neck supple and non tender without mass   Pulmonary/Chest: clear to auscultation bilaterally- no wheezes, rales or rhonchi, normal air movement, no respiratory distress  Cardiovascular: Some rhonchi present bilaterally,Normal rate, normal S1 and S2 and no carotid bruits  Abdomen: soft, non-tender, non-distended, normal bowel sounds, no masses or organomegaly  Extremities: no cyanosis, no clubbing and no edema  Neurologic: no cranial nerve deficit and speech normal        Recent Labs     09/28/24 0415 09/29/24 0408 09/30/24  0428    136 135   K 4.9 4.7 4.5    101 98   CO2 25 24 23   BUN 35* 43* 37*   CREATININE 0.8 0.8 0.8   GLUCOSE 207* 238* 251*   CALCIUM 9.4 9.2 9.7       Recent Labs     09/28/24 0415 09/29/24 0408 09/30/24  0428   WBC 14.3* 10.6 8.4   RBC 4.59 5.00 5.63   HGB 13.1 14.3 15.9   HCT 39.6 42.1 47.3   MCV 86.3 84.2 84.0   MCH 28.5 28.6 28.2   MCHC 33.1 34.0 33.6   RDW 12.9 12.5 12.1    293 331   MPV 9.3 9.2 8.9         Assessment:    Principal Problem:    Acute respiratory failure (HCC)  Active Problems:    Asthma with acute exacerbation    Poorly controlled type 2 diabetes mellitus (HCC)    Dietary noncompliance    Steroid-induced hyperglycemia    Mixed hyperlipidemia  Resolved 
ipratropium 0.5 mg-albuterol 2.5 mg  1 Dose Inhalation Q4H While awake     ibuprofen, 400 mg, Q6H PRN  acetaminophen, 650 mg, Q6H PRN   Or  acetaminophen, 650 mg, Q6H PRN  ipratropium 0.5 mg-albuterol 2.5 mg, 1 Dose, Q4H PRN  hydrOXYzine, 25 mg, Q6H PRN  glucose, 4 tablet, PRN  dextrose bolus, 125 mL, PRN   Or  dextrose bolus, 250 mL, PRN  glucagon (rDNA), 1 mg, PRN  dextrose, , Continuous PRN  benzocaine-menthol, 1 lozenge, Q2H PRN  benzonatate, 100 mg, TID PRN  calcium carbonate, 500 mg, TID PRN  hydrALAZINE, 10 mg, Q6H PRN  Polyvinyl Alcohol-Povidone PF, 1 drop, PRN  sodium chloride, 1 spray, PRN  sodium chloride flush, 5-40 mL, PRN  sodium chloride, , PRN  ondansetron, 4 mg, Q8H PRN   Or  ondansetron, 4 mg, Q6H PRN  polyethylene glycol, 17 g, Daily PRN  glucose, 4 tablet, PRN  dextrose bolus, 125 mL, PRN   Or  dextrose bolus, 250 mL, PRN  dextrose, , Continuous PRN         Objective:    /66   Pulse 93   Temp 98.1 °F (36.7 °C) (Oral)   Resp 18   Ht 1.829 m (6')   Wt 90.7 kg (200 lb)   SpO2 96%   BMI 27.12 kg/m²     General Appearance:intubated  Skin: warm and dry  Head: normocephalic and atraumatic  Eyes: pupils equal, round, and reactive to light, conjunctivae normal  Neck: neck supple and non tender without mass   Pulmonary/Chest: clear to auscultation bilaterally- no wheezes, rales or rhonchi, normal air movement, no respiratory distress  Cardiovascular: Some rhonchi present bilaterally,Normal rate, normal S1 and S2 and no carotid bruits  Abdomen: soft, non-tender, non-distended, normal bowel sounds, no masses or organomegaly  Extremities: no cyanosis, no clubbing and no edema  Neurologic: no cranial nerve deficit and speech normal        Recent Labs     09/29/24  0408 09/30/24  0428 10/01/24  0541    135 136   K 4.7 4.5 3.9    98 98   CO2 24 23 23   BUN 43* 37* 36*   CREATININE 0.8 0.8 0.8   GLUCOSE 238* 251* 206*   CALCIUM 9.2 9.7 9.9       Recent Labs     09/29/24  0408 
09/29/24  0408 09/30/24  0428 10/01/24  0541    135 136   K 4.7 4.5 3.9    98 98   CO2 24 23 23   BUN 43* 37* 36*   CREATININE 0.8 0.8 0.8   GLUCOSE 238* 251* 206*   CALCIUM 9.2 9.7 9.9     Beta-Hydroxybutyrate   Date Value Ref Range Status   09/26/2024 0.17 0.02 - 0.27 mmol/L Final   09/26/2024 results removed preanalytical error 0.02 - 0.27 mmol/L Corrected     Comment:     CORRECTED ON 09/26 AT 1254: PREVIOUSLY REPORTED AS 0.16     Lab Results   Component Value Date/Time    LABA1C 7.9 09/26/2024 03:57 AM     Lab Results   Component Value Date/Time    TSH 0.57 09/26/2024 03:57 AM     Lab Results   Component Value Date/Time    LABA1C 7.9 09/26/2024 03:57 AM    GLUCOSE 206 10/01/2024 05:41 AM     Lab Results   Component Value Date/Time    TRIG 249 09/27/2024 04:09 AM    HDL 69 09/26/2024 03:57 AM    CHOL 216 09/26/2024 03:57 AM       Blood culture   No results found for: \"BC\"    Radiology:  XR CHEST PORTABLE   Final Result   1. No significant interval change in the opacity in the right lower lung   field when compared to the previous study performed 09/28/2024.   2. Interval removal of the right-sided IJ line.         XR CHEST PORTABLE   Final Result   1. New right internal jugular central line with its tip in satisfactory   position in the superior vena cava. No sign of pneumothorax on this side.   2. New mild linear atelectasis in the right mid-lower lung.         XR CHEST PORTABLE   Final Result   1. Interval improvement in the focal patchy density in the right lower lung.   2. Residual ground-glass opacity in the peripheral left lung.   3. Interval withdrawal of the endotracheal tube and gastric catheter.   4. No new alveolar consolidation.         XR CHEST PORTABLE   Final Result   Stable chest with lines and tubes in satisfactory position. No new focal   parenchymal opacification present.         XR CHEST PORTABLE   Final Result   1. Support apparatus as above.   2. Stable appearance of the chest.  
Results   Component Value Date/Time     10/02/2024 06:18 AM    K 3.8 10/02/2024 06:18 AM    CL 99 10/02/2024 06:18 AM    CO2 24 10/02/2024 06:18 AM    BUN 47 10/02/2024 06:18 AM    CREATININE 1.3 10/02/2024 06:18 AM    GLUCOSE 138 10/02/2024 06:18 AM    CALCIUM 9.8 10/02/2024 06:18 AM    LABGLOM 70 10/02/2024 06:18 AM            Recent Labs     09/30/24  0428 10/01/24  0541 10/02/24  0618   WBC 8.4 9.4 13.5*   RBC 5.63 5.95* 6.00*   HGB 15.9 16.9* 17.1*   HCT 47.3 49.3 49.5   MCV 84.0 82.9 82.5   MCH 28.2 28.4 28.5   MCHC 33.6 34.3 34.5   RDW 12.1 12.1 12.5    365 408   MPV 8.9 9.0 9.1     Recent Labs     09/30/24  0428 10/01/24  0541 10/02/24  0618    136 139   K 4.5 3.9 3.8   CL 98 98 99   CO2 23 23 24   BUN 37* 36* 47*   CREATININE 0.8 0.8 1.3*   GLUCOSE 251* 206* 138*   CALCIUM 9.7 9.9 9.8     Beta-Hydroxybutyrate   Date Value Ref Range Status   09/26/2024 0.17 0.02 - 0.27 mmol/L Final   09/26/2024 results removed preanalytical error 0.02 - 0.27 mmol/L Corrected     Comment:     CORRECTED ON 09/26 AT 1254: PREVIOUSLY REPORTED AS 0.16     Lab Results   Component Value Date/Time    LABA1C 7.9 09/26/2024 03:57 AM     Lab Results   Component Value Date/Time    TSH 0.57 09/26/2024 03:57 AM     Lab Results   Component Value Date/Time    LABA1C 7.9 09/26/2024 03:57 AM    GLUCOSE 138 10/02/2024 06:18 AM     Lab Results   Component Value Date/Time    TRIG 249 09/27/2024 04:09 AM    HDL 69 09/26/2024 03:57 AM    CHOL 216 09/26/2024 03:57 AM       Blood culture   No results found for: \"BC\"    Radiology:  XR CHEST PORTABLE   Final Result   1. No significant interval change in the opacity in the right lower lung   field when compared to the previous study performed 09/28/2024.   2. Interval removal of the right-sided IJ line.         XR CHEST PORTABLE   Final Result   1. New right internal jugular central line with its tip in satisfactory   position in the superior vena cava. No sign of pneumothorax on 
signed by MEMO BURK MD on 9/26/2024 at 7:36 PM     
sustained in the 100s initially   Most recent HR was 78     Plan  - Improved off vent, now on RA  - Continue to monitor respiratory function   - Monitor telemetry      Hx HFpEF  TTE 9/26/24 EF 60-65%  Home medications include losartan 25 mg once daily, hold at this time     Pulm  Acute hypercapnic hypoxic respiratory failure likely 2/2 asthma with hypereosinophilia vs PNA  Patient has a history of asthma, at home regimen noted below  CBC revealed peripheral eosinophilia 10%  Patient was previously on vent fully sedated and intubated, now patient has been extubated to NC with all sedation and ketamine off 9/27  Respiratory panel negative, cultures negative at this time  Immunoglobulins normal      Plan  - Follow alpha 1 antitrypsin, allergen respiratory panel, hypersensitivity pneumonitis panel  -D/C Brovana  -Switched steroids to oral Prednisone 40 mg tab daily x 5 doses  -Patient was given 3A of bicarb previously  -Patient experienced SOB on NC, Duoneb treatment and IV mag given as well transition to BiPAP 10/5, status improved -> now on 3L O2 by NC 9/28-> now on RA 9/29 O2 sat 97%  -Patient was on vancomycin and cefepime, de-escalated to ceftriaxone, now off all antibiotics as infective work up is negative'  - transfer to floors     Hx of asthma  Home medication patient regimen includes Proair, DuoNeb, montelukast, Symbicort  Will hold home medications at this time  Pulmonology consulted, appreciate recommendations     ID  Leukocytosis r/o sepsis, PNA  Cultures negative at this time  Most recent WBC 14.1  Likely due to steroid use     Plan  -Negative infective w/u  -D/c ceftriaxone     Renal  Metabolic acidosis (improved)  Most recent ABG pH 7.407, pCO2 38.8, pO2 148.3, HCO3 23.8     Endo  T2DM  At home medication regimen 40 units Humulin twice daily with meals and metformin 500 mg  Patient will be on 12 units Lantus nightly, ISS     2. Mild hypertriglyceridemia   Most recent triglycerides 249  At home medication 
once daily, hold at this time    Pulm  Acute hypercapnic hypoxic respiratory failure likely 2/2 asthma with hypereosinophilia vs PNA  Patient has a history of asthma, at home regimen noted below  CBC revealed peripheral eosinophilia 10%  Patient was previously on vent fully sedated and intubated, now patient has been extubated to NC with all sedation and ketamine off 9/27  Respiratory panel negative, cultures negative at this time    Plan  - Follow alpha 1 antitrypsin, immunoglobulins with IGG subclasses, allergen respiratory panel, hypersensitivity pneumonitis panel  -D/C Brovana  -Continue IV steroids 60 mg every 6 hours  -Patient was given 3A of bicarb previously  -Patient experienced SOB on NC, Duoneb treatment and IV mag given as well transition to BiPAP 10/5, status improved   -Patient was on vancomycin and cefepime, de-escalated to ceftriaxone  -Follow infective workup       Hx of asthma  Home medication patient regimen includes Proair, DuoNeb, montelukast, Symbicort  Will hold home medications at this time    ID  Leukocytosis r/o sepsis, PNA  Cultures negative at this time  Most recent WBC 14.1  Likely due to steroid use    Plan  -Follow infective workup  -Continue ceftriaxone    Renal  Metabolic acidosis (improved)  Most recent ABG pH 7.42, pCO2 34.5, pO2 92.7, HCO3 22.3    Endo  T2DM  At home medication regimen 40 units Humulin twice daily with meals and metformin 500 mg  Patient will be on 12 units Lantus nightly, ISS    2. Mild hypertriglyceridemia   Most recent triglycerides 249  At home medication regimen includes rosuvastatin 5 mg once daily, on hold at this time    Psych  Substance use (cocaine, aderall, ecstasy, methamphetamine)  Full amphetamine panel was negative  Serum drug screen negative  Urine drug screen positive for benzodiazepine, he was on Versed keith Soler DO, PGY-1    Attending physician: Dr. Domingo    Attending Physician Attestation: Dr. Solitario Domingo     Thank you very 
Consultations:  VENT: ACVC (DAY 2) 22/500/50/8, Et Tube 6.5  Ppeak: 41  Pplateau: 19  Compliance: 49    Access: Peripheral   Consults: N/A  Drips: Versed, Propofol  DVT Phylaxis: Lovenox   GI Prophylaxis: PPI  Nutrition: NPO  ABX: Vancomycin, Cefepime     - ketamine drip   - D/C Brovana  - IV Steroids to q 6 hours 60 mg   - 3 amps bicarbonate   - full amphetamine panel     Thank you for allowing me to participate in the care of this patient.    Care reviewed with nursing staff, medical and surgical specialty care, primary care and the patient's family as available. Restraints are ordered when the patient can do harm to him/herself by pulling out devices.    Critical Care Time: 32 minutes excluding procedures    Solitario Domingo M.D.    Solitario Domingo MD  9/26/2024  10:21 AM    
document   resolution and exclude an underlying pathologic process.     Physical Examination:  Vitals:   Vitals:    09/28/24 0310 09/28/24 0400 09/28/24 0500 09/28/24 0600   BP:  (!) 139/106 (!) 137/96 124/81   Pulse: 70 65 70 60   Resp: 17 16 19 23   Temp:  97.1 °F (36.2 °C)     TempSrc:  Axillary     SpO2: 99%  99%    Weight:       Height:          General: No Acute Distress  HEENT: normocephalic, atraumatic  Abdomen: soft, NT, ND  CVS: RRR, S1, S2, No S3 or S4  Respiratory: decreased breath sounds at lung bases, no focal wheezes noted  Extremities: no clubbing/cyanosis/edema  Neuro: awake, alert    ASSESSMENT:  Acute Hypoxic and Hypercapnic Respiratory Failure - secondary to Status Asthmaticus   Extubation 9/27/2024  Lactic Acidosis - resolved  Status Asthmaticus  Hyperkalemia - secondary to acidosis  H/O Polysubstance Abuse   Hypophosphatemia   H/O Hodgkin's Lymphoma per chart   Peripheral Eosinophilia - Eos 1650, 10%  Look to outpatient Dupixent or Fasenra  Outpatient PFT  Mild Hypertriglyceridemia -     In addition the following applies:    Check: serial labs, A1AT, Resp Allergy Panel   Medication Alterations: N/A   Procedures: N/A  Imaging: reviewed   New Consultations:  VENT: N/A, s/p extubation 9/27/2024    Access: Peripheral   Consults: N/A  Drips: N/A  DVT Phylaxis: Lovenox   GI Prophylaxis: PPI  Nutrition: NPO  ABX: N/A  Completed ABX: Vancomycin, Cefepime     - given persistent wheezes patient to remain in ICU level of care   - D/C abx    Thank you for allowing me to participate in the care of this patient.    Care reviewed with nursing staff, medical and surgical specialty care, primary care and the patient's family as available. Restraints are ordered when the patient can do harm to him/herself by pulling out devices.    Critical Care Time: 32 minutes excluding procedures    Solitario Domingo M.D.    Solitario Domingo MD  9/28/2024  10:58 AM

## 2024-10-02 NOTE — PLAN OF CARE
Problem: Discharge Planning  Goal: Discharge to home or other facility with appropriate resources  10/2/2024 0130 by Zenaida Pena RN  Outcome: Progressing  10/1/2024 1147 by Lazara Mcarthur RN  Outcome: Progressing     Problem: Pain  Goal: Verbalizes/displays adequate comfort level or baseline comfort level  10/2/2024 0130 by Zenaida Pena RN  Outcome: Progressing  10/1/2024 1147 by Lazara Mcarthur RN  Outcome: Progressing     Problem: Safety - Adult  Goal: Free from fall injury  10/2/2024 0130 by Zenaida Pena RN  Outcome: Progressing  10/1/2024 1147 by Lazara Mcarthur RN  Outcome: Progressing     Problem: Neurosensory - Adult  Goal: Achieves stable or improved neurological status  10/2/2024 0130 by Zenaida Pena RN  Outcome: Progressing  10/1/2024 1147 by Lazara Mcarthur RN  Outcome: Progressing     Problem: Respiratory - Adult  Goal: Achieves optimal ventilation and oxygenation  10/2/2024 0130 by Zenaida Pena RN  Outcome: Progressing  10/1/2024 1147 by Lazara Mcarthur RN  Outcome: Progressing     Problem: Cardiovascular - Adult  Goal: Maintains optimal cardiac output and hemodynamic stability  10/2/2024 0130 by Zenaida Pena RN  Outcome: Progressing  10/1/2024 1147 by Lazara Mcarthur RN  Outcome: Progressing     Problem: Skin/Tissue Integrity - Adult  Goal: Skin integrity remains intact  10/2/2024 0130 by Zenaida Pena RN  Outcome: Progressing  10/1/2024 1147 by Lazara Mcarthur RN  Outcome: Progressing  Flowsheets (Taken 10/1/2024 0800)  Skin Integrity Remains Intact: Monitor for areas of redness and/or skin breakdown  Goal: Oral mucous membranes remain intact  10/2/2024 0130 by Zenaida Pena RN  Outcome: Progressing  10/1/2024 1147 by Lazara Mcarthur RN  Outcome: Progressing     Problem: Musculoskeletal - Adult  Goal: Return mobility to safest level of function  10/2/2024 0130 by Zenaida Pena RN  Outcome: Progressing  10/1/2024 1147 by Lazara Mcarthur RN  Outcome: 
  Problem: Discharge Planning  Goal: Discharge to home or other facility with appropriate resources  9/27/2024 2056 by Elodia Witt RN  Outcome: Progressing  Flowsheets (Taken 9/27/2024 2000)  Discharge to home or other facility with appropriate resources:   Identify barriers to discharge with patient and caregiver   Arrange for needed discharge resources and transportation as appropriate   Identify discharge learning needs (meds, wound care, etc)   Refer to discharge planning if patient needs post-hospital services based on physician order or complex needs related to functional status, cognitive ability or social support system     Problem: Pain  Goal: Verbalizes/displays adequate comfort level or baseline comfort level  9/27/2024 2056 by Eloida Witt RN  Outcome: Progressing     Problem: Safety - Adult  Goal: Free from fall injury  9/27/2024 2056 by Elodia Witt RN  Outcome: Progressing  Flowsheets (Taken 9/27/2024 1945)  Free From Fall Injury: Instruct family/caregiver on patient safety     Problem: Respiratory - Adult  Goal: Achieves optimal ventilation and oxygenation  9/27/2024 2056 by Elodia Witt RN  Outcome: Progressing     Problem: Skin/Tissue Integrity - Adult  Goal: Skin integrity remains intact  9/27/2024 2056 by Elodia Witt RN  Outcome: Progressing  Flowsheets (Taken 9/27/2024 1945)  Skin Integrity Remains Intact:   Monitor for areas of redness and/or skin breakdown   Assess vascular access sites hourly     Problem: Anxiety  Goal: Will report anxiety at manageable levels  Description: INTERVENTIONS:  1. Administer medication as ordered  2. Teach and rehearse alternative coping skills  3. Provide emotional support with 1:1 interaction with staff  9/27/2024 2056 by Elodia Witt RN  Outcome: Progressing     Problem: Gastrointestinal - Adult  Goal: Maintains adequate nutritional intake  9/27/2024 2056 by Elodia Witt RN  Outcome: Not Progressing  9/27/2024 0817 by Elva Paris 
  Problem: Discharge Planning  Goal: Discharge to home or other facility with appropriate resources  9/30/2024 0820 by Tamika Nunez RN  Outcome: Progressing     
  Problem: Discharge Planning  Goal: Discharge to home or other facility with appropriate resources  Outcome: Progressing  Flowsheets  Taken 9/28/2024 2000 by Elodia Witt RN  Discharge to home or other facility with appropriate resources: Identify barriers to discharge with patient and caregiver  Taken 9/28/2024 0800 by Tamika Nunez RN  Discharge to home or other facility with appropriate resources: Identify barriers to discharge with patient and caregiver     Problem: Pain  Goal: Verbalizes/displays adequate comfort level or baseline comfort level  Outcome: Progressing  Flowsheets (Taken 9/28/2024 2000)  Verbalizes/displays adequate comfort level or baseline comfort level:   Encourage patient to monitor pain and request assistance   Assess pain using appropriate pain scale     Problem: Safety - Adult  Goal: Free from fall injury  9/28/2024 2040 by Elodia Witt RN  Outcome: Progressing  Flowsheets (Taken 9/28/2024 1950)  Free From Fall Injury: Instruct family/caregiver on patient safety     Problem: Neurosensory - Adult  Goal: Achieves stable or improved neurological status  Outcome: Progressing  Flowsheets (Taken 9/28/2024 2000)  Achieves stable or improved neurological status: Assess for and report changes in neurological status     Problem: Respiratory - Adult  Goal: Achieves optimal ventilation and oxygenation  Outcome: Progressing  Flowsheets (Taken 9/28/2024 2000)  Achieves optimal ventilation and oxygenation:   Assess for changes in respiratory status   Assess for changes in mentation and behavior   Position to facilitate oxygenation and minimize respiratory effort   Oxygen supplementation based on oxygen saturation or arterial blood gases     Problem: Cardiovascular - Adult  Goal: Maintains optimal cardiac output and hemodynamic stability  Outcome: Progressing     Problem: Skin/Tissue Integrity - Adult  Goal: Skin integrity remains intact  Outcome: Progressing  Flowsheets (Taken 9/28/2024 1950)  Skin 
  Problem: Discharge Planning  Goal: Discharge to home or other facility with appropriate resources  Outcome: Progressing  Flowsheets (Taken 9/29/2024 2000)  Discharge to home or other facility with appropriate resources: Identify barriers to discharge with patient and caregiver     Problem: Pain  Goal: Verbalizes/displays adequate comfort level or baseline comfort level  Outcome: Progressing  Flowsheets (Taken 9/29/2024 2000)  Verbalizes/displays adequate comfort level or baseline comfort level: Assess pain using appropriate pain scale     Problem: Safety - Adult  Goal: Free from fall injury  Outcome: Progressing  Flowsheets (Taken 9/29/2024 2244)  Free From Fall Injury: Instruct family/caregiver on patient safety     Problem: Neurosensory - Adult  Goal: Achieves stable or improved neurological status  Outcome: Progressing     Problem: Respiratory - Adult  Goal: Achieves optimal ventilation and oxygenation  Outcome: Progressing     Problem: Cardiovascular - Adult  Goal: Maintains optimal cardiac output and hemodynamic stability  Outcome: Progressing     Problem: Skin/Tissue Integrity - Adult  Goal: Skin integrity remains intact  Outcome: Progressing  Flowsheets  Taken 9/29/2024 2244  Skin Integrity Remains Intact:   Assess vascular access sites hourly   Monitor for areas of redness and/or skin breakdown  Taken 9/29/2024 2000  Skin Integrity Remains Intact:   Assess vascular access sites hourly   Monitor for areas of redness and/or skin breakdown  Goal: Oral mucous membranes remain intact  Outcome: Progressing  Flowsheets  Taken 9/29/2024 2244  Oral Mucous Membranes Remain Intact:   Assess oral mucosa and hygiene practices   Implement preventative oral hygiene regimen  Taken 9/29/2024 2000  Oral Mucous Membranes Remain Intact: Assess oral mucosa and hygiene practices     Problem: Musculoskeletal - Adult  Goal: Return mobility to safest level of function  Outcome: Progressing  Flowsheets (Taken 9/29/2024 
  Problem: Pain  Goal: Verbalizes/displays adequate comfort level or baseline comfort level  9/29/2024 0840 by Tamika Nunez, RN  Outcome: Not Progressing  9/28/2024 2040 by Elodia Witt, RN  Outcome: Progressing  Flowsheets (Taken 9/28/2024 2000)  Verbalizes/displays adequate comfort level or baseline comfort level:   Encourage patient to monitor pain and request assistance   Assess pain using appropriate pain scale     
  Problem: Safety - Adult  Goal: Free from fall injury  Outcome: Progressing     
  Problem: Safety - Violent/Self-destructive Restraint  Goal: Remains free of injury from restraints (Restraint for Violent/Self-Destructive Behavior)  Description: INTERVENTIONS:  1. Determine that de-escalation and other, less restrictive measures have been tried or would not be effective before applying the restraint  2. Identify and document the criteria for restraint  3. Evaluate the patient's condition at the time of restraint application  4. Inform patient/family regarding the reason for restraint/seclusion  5. Q2H: Monitor comfort, nutrition and hydration needs  6. Q15M: Perform safety checks including skin, circulation, sensory, respiratory and psychological status  7. Ensure continuous observation  8. Identify and implement measures to help patient regain control, assess readiness for release and initiate progressive release per policy  9/26/2024 0911 by Elva Paris RN  Outcome: Progressing  9/26/2024 0351 by Jace Mayers RN  Outcome: Progressing     Problem: Safety - Medical Restraint  Goal: Remains free of injury from restraints (Restraint for Interference with Medical Device)  Description: INTERVENTIONS:  1. Determine that other, less restrictive measures have been tried or would not be effective before applying the restraint  2. Evaluate the patient's condition at the time of restraint application  3. Inform patient/family regarding the reason for restraint  4. Q2H: Monitor safety, psychosocial status, comfort, nutrition and hydration  9/26/2024 0911 by Elva Paris RN  Outcome: Progressing  Flowsheets  Taken 9/26/2024 0800  Remains free of injury from restraints (restraint for interference with medical device):   Determine that other, less restrictive measures have been tried or would not be effective before applying the restraint   Every 2 hours: Monitor safety, psychosocial status, comfort, nutrition and hydration  Taken 9/26/2024 0739  Remains free of injury from restraints (restraint for 
  Problem: Safety - Violent/Self-destructive Restraint  Goal: Remains free of injury from restraints (Restraint for Violent/Self-Destructive Behavior)  Description: INTERVENTIONS:  1. Determine that de-escalation and other, less restrictive measures have been tried or would not be effective before applying the restraint  2. Identify and document the criteria for restraint  3. Evaluate the patient's condition at the time of restraint application  4. Inform patient/family regarding the reason for restraint/seclusion  5. Q2H: Monitor comfort, nutrition and hydration needs  6. Q15M: Perform safety checks including skin, circulation, sensory, respiratory and psychological status  7. Ensure continuous observation  8. Identify and implement measures to help patient regain control, assess readiness for release and initiate progressive release per policy  9/26/2024 2120 by Elodia Witt RN  Outcome: Progressing     Problem: Safety - Medical Restraint  Goal: Remains free of injury from restraints (Restraint for Interference with Medical Device)  Description: INTERVENTIONS:  1. Determine that other, less restrictive measures have been tried or would not be effective before applying the restraint  2. Evaluate the patient's condition at the time of restraint application  3. Inform patient/family regarding the reason for restraint  4. Q2H: Monitor safety, psychosocial status, comfort, nutrition and hydration  9/26/2024 2120 by Elodia Witt RN  Outcome: Progressing  Flowsheets (Taken 9/26/2024 2000)  Remains free of injury from restraints (restraint for interference with medical device):   Determine that other, less restrictive measures have been tried or would not be effective before applying the restraint   Evaluate the patient's condition at the time of restraint application   Inform patient/family regarding the reason for restraint   Every 2 hours: Monitor safety, psychosocial status, comfort, nutrition and hydration   
Restraints removed after extubation.    Problem: Safety - Violent/Self-destructive Restraint  Goal: Remains free of injury from restraints (Restraint for Violent/Self-Destructive Behavior)  Description: INTERVENTIONS:  1. Determine that de-escalation and other, less restrictive measures have been tried or would not be effective before applying the restraint  2. Identify and document the criteria for restraint  3. Evaluate the patient's condition at the time of restraint application  4. Inform patient/family regarding the reason for restraint/seclusion  5. Q2H: Monitor comfort, nutrition and hydration needs  6. Q15M: Perform safety checks including skin, circulation, sensory, respiratory and psychological status  7. Ensure continuous observation  8. Identify and implement measures to help patient regain control, assess readiness for release and initiate progressive release per policy  9/27/2024 1140 by Elva Paris RN  Outcome: Completed  9/27/2024 0817 by Elva Paris RN  Outcome: Progressing     Problem: Safety - Medical Restraint  Goal: Remains free of injury from restraints (Restraint for Interference with Medical Device)  Description: INTERVENTIONS:  1. Determine that other, less restrictive measures have been tried or would not be effective before applying the restraint  2. Evaluate the patient's condition at the time of restraint application  3. Inform patient/family regarding the reason for restraint  4. Q2H: Monitor safety, psychosocial status, comfort, nutrition and hydration  9/27/2024 1140 by Elva Paris RN  Outcome: Completed  9/27/2024 0817 by Elva Paris RN  Outcome: Progressing  Flowsheets  Taken 9/27/2024 0800 by Elva Paris RN  Remains free of injury from restraints (restraint for interference with medical device):   Determine that other, less restrictive measures have been tried or would not be effective before applying the restraint   Every 2 hours: Monitor safety, psychosocial status, 
  Problem: Spiritual Care  Goal: Pt/Family able to move forward in process of forgiving self, others, and/or higher power  Description: INTERVENTIONS:  1. Assist patient/family to overcome blocks to healing by use of spiritual practices (prayer, meditation, guided imagery, reiki, breath work, etc).  2. De-myth guilt and help patient/family identify possible irrational spiritual/cultural beliefs and values.  3. Explore possibilities of making amends & reconciliation with self, others, and/or a greater power.  4. Guide patient/family in identifying painful feelings of guilt.  5. Help patient/famiy explore and identify spiritual beliefs, cultural understandings or values that may help or hinder letting go of issue.  6. Help patient/family explore feelings of anger, bitterness, resentment.  7. Help patient/family identify and examine the situation in which these feelings are experienced.  8. Help patient/family identify destructive displacement of feelings onto other individuals.  9. Invite use of sacraments/rituals/ceremonies as appropriate (e.g. - confession, anointing, smudging).  10. Refer patient/family to formal counseling and/or to fely community for further support work.  Outcome: Progressing     Problem: Coping  Goal: Pt/Family able to verbalize concerns and demonstrate effective coping strategies  Description: INTERVENTIONS:  1. Assist patient/family to identify coping skills, available support systems and cultural and spiritual values  2. Provide emotional support, including active listening and acknowledgement of concerns of patient and caregivers  3. Reduce environmental stimuli, as able  4. Instruct patient/family in relaxation techniques, as appropriate  5. Assess for spiritual pain/suffering and initiate Spiritual Care, Psychosocial Clinical Specialist consults as needed  Outcome: Progressing     Problem: Anxiety  Goal: Will report anxiety at manageable levels  Description: INTERVENTIONS:  1. Administer 
ordered and minimize all controllable stressors.     Problem: Skin/Tissue Integrity - Adult  Goal: Skin integrity remains intact  10/1/2024 1147 by Lazara Mcarthur RN  Outcome: Progressing  Flowsheets (Taken 10/1/2024 0800)  Skin Integrity Remains Intact: Monitor for areas of redness and/or skin breakdown  9/30/2024 2315 by Elodia Witt RN  Outcome: Progressing  Flowsheets (Taken 9/30/2024 2000)  Skin Integrity Remains Intact:   Monitor for areas of redness and/or skin breakdown   Assess vascular access sites hourly  Goal: Oral mucous membranes remain intact  10/1/2024 1147 by Lazara Mcarthur RN  Outcome: Progressing  9/30/2024 2315 by Elodia Witt RN  Outcome: Progressing  Flowsheets (Taken 9/30/2024 2000)  Oral Mucous Membranes Remain Intact: Assess oral mucosa and hygiene practices     Problem: Musculoskeletal - Adult  Goal: Return mobility to safest level of function  10/1/2024 1147 by Lazara Mcarthur RN  Outcome: Progressing  9/30/2024 2315 by Elodia Witt RN  Outcome: Progressing  Goal: Maintain proper alignment of affected body part  10/1/2024 1147 by Lazara Mcarthur RN  Outcome: Progressing  9/30/2024 2315 by Elodia Witt RN  Outcome: Progressing  Goal: Return ADL status to a safe level of function  10/1/2024 1147 by Lazara Mcarthur RN  Outcome: Progressing  9/30/2024 2315 by Elodia Witt RN  Outcome: Progressing     Problem: Gastrointestinal - Adult  Goal: Maintains or returns to baseline bowel function  10/1/2024 1147 by Lazara Mcarthur RN  Outcome: Progressing  9/30/2024 2315 by Elodia Witt RN  Outcome: Progressing  Goal: Maintains adequate nutritional intake  10/1/2024 1147 by Lazara Mcarthur RN  Outcome: Progressing  9/30/2024 2315 by Elodia Witt RN  Outcome: Progressing     Problem: Genitourinary - Adult  Goal: Absence of urinary retention  10/1/2024 1147 by Lazara Mcarthur RN  Outcome: Progressing  9/30/2024 2315 by Elodia Witt RN  Outcome: Progressing     Problem: Infection - Adult  Goal: 
move forward in process of forgiving self, others, and/or higher power  Description: INTERVENTIONS:  1. Assist patient/family to overcome blocks to healing by use of spiritual practices (prayer, meditation, guided imagery, reiki, breath work, etc).  2. De-myth guilt and help patient/family identify possible irrational spiritual/cultural beliefs and values.  3. Explore possibilities of making amends & reconciliation with self, others, and/or a greater power.  4. Guide patient/family in identifying painful feelings of guilt.  5. Help patient/famiy explore and identify spiritual beliefs, cultural understandings or values that may help or hinder letting go of issue.  6. Help patient/family explore feelings of anger, bitterness, resentment.  7. Help patient/family identify and examine the situation in which these feelings are experienced.  8. Help patient/family identify destructive displacement of feelings onto other individuals.  9. Invite use of sacraments/rituals/ceremonies as appropriate (e.g. - confession, anointing, smudging).  10. Refer patient/family to formal counseling and/or to fely community for further support work.  Outcome: Progressing     Problem: Skin/Tissue Integrity  Goal: Absence of new skin breakdown  Description: 1.  Monitor for areas of redness and/or skin breakdown  2.  Assess vascular access sites hourly  3.  Every 4-6 hours minimum:  Change oxygen saturation probe site  4.  Every 4-6 hours:  If on nasal continuous positive airway pressure, respiratory therapy assess nares and determine need for appliance change or resting period.  Outcome: Progressing     Problem: ABCDS Injury Assessment  Goal: Absence of physical injury  Outcome: Progressing  Flowsheets (Taken 9/30/2024 2000)  Absence of Physical Injury: Implement safety measures based on patient assessment     Problem: Chronic Conditions and Co-morbidities  Goal: Patient's chronic conditions and co-morbidity symptoms are monitored and 
with medical device):   Determine that other, less restrictive measures have been tried or would not be effective before applying the restraint   Evaluate the patient's condition at the time of restraint application   Inform patient/family regarding the reason for restraint   Every 2 hours: Monitor safety, psychosocial status, comfort, nutrition and hydration  9/26/2024 2120 by Elodia Witt RN  Outcome: Progressing  Flowsheets (Taken 9/26/2024 2000)  Remains free of injury from restraints (restraint for interference with medical device):   Determine that other, less restrictive measures have been tried or would not be effective before applying the restraint   Evaluate the patient's condition at the time of restraint application   Inform patient/family regarding the reason for restraint   Every 2 hours: Monitor safety, psychosocial status, comfort, nutrition and hydration     Problem: Discharge Planning  Goal: Discharge to home or other facility with appropriate resources  9/27/2024 0817 by Elva Paris RN  Outcome: Progressing  Flowsheets (Taken 9/27/2024 0800)  Discharge to home or other facility with appropriate resources:   Identify barriers to discharge with patient and caregiver   Arrange for needed discharge resources and transportation as appropriate   Identify discharge learning needs (meds, wound care, etc)   Arrange for interpreters to assist at discharge as needed   Refer to discharge planning if patient needs post-hospital services based on physician order or complex needs related to functional status, cognitive ability or social support system  9/26/2024 2120 by Elodia Witt RN  Outcome: Progressing  Flowsheets (Taken 9/26/2024 2000)  Discharge to home or other facility with appropriate resources: Identify barriers to discharge with patient and caregiver     Problem: Pain  Goal: Verbalizes/displays adequate comfort level or baseline comfort level  9/27/2024 0817 by Elva Paris RN  Outcome:

## 2024-10-02 NOTE — CARE COORDINATION
Discharge order noted,  called Jovanny at [facility], he will be arranging transport for pt to sober living in [place]. Jovanny to call with a  time. Pt will have paper scripts to take with him. Met with pt and updated him.China Winkler, MSW, LSW

## 2024-10-03 LAB
A FUMIGATUS1 AB SER QL ID: NORMAL
A FUMIGATUS6 AB SER QL ID: NORMAL
A PULLULANS AB SER QL ID: NORMAL
ALLERGEN BIRCH IGE: <0.1 KU/L (ref 0–0.34)
CMN PIGWEED IGE QN: <0.1 KU/L (ref 0–0.34)
COMMON RAGWEED IGE QN: <0.1 KU/L (ref 0–0.34)
MT JUNIPER IGE QN: <0.1 KU/L (ref 0–0.34)
PECAN/HICK TREE IGE QN: <0.1 KU/L (ref 0–0.34)
PIGEON SERUM AB QL ID: NORMAL
S RECTIVIRGULA AB SER QL ID: NORMAL
SALTWORT IGE QN: <0.1 KU/L (ref 0–0.34)
SHEEP SORREL IGE QN: <0.1 KU/L (ref 0–0.34)
WHITE ELM IGE QN: <0.1 KU/L (ref 0–0.34)
WHITE OAK IGE QN: <0.1 KU/L (ref 0–0.34)

## 2024-12-11 NOTE — CARE COORDINATION
Care Coordination: LOS 1 day.  Per ER, pt was sent from [facility] with SOB- intubated in ER and admit to MICU. Propofol, Versed, cefepime, Vanc.  No contacts, pcp or insurance listed.  [employer] of his address is an “[employer] in [place].  I called [facility] and spoke to Jo, she is trying to get clearance to release emergency contact and call me back. Will follow for transition of care needs.    Electronically signed by Sarina Ceja RN on 9/26/2024 at 9:49 AM       ADDENDUM: received call back from Aline at [facility].  Pt has Emergency contact listed, Brother: Danielle Abarca  [phone]. Placed on contacts    Electronically signed by Sarina Ceja RN on 9/26/2024 at 10:23 AM    normal mood with appropriate affect